# Patient Record
Sex: FEMALE | Race: WHITE | NOT HISPANIC OR LATINO | Employment: OTHER | URBAN - METROPOLITAN AREA
[De-identification: names, ages, dates, MRNs, and addresses within clinical notes are randomized per-mention and may not be internally consistent; named-entity substitution may affect disease eponyms.]

---

## 2018-10-01 PROBLEM — M17.11 PRIMARY OSTEOARTHRITIS OF RIGHT KNEE: Status: ACTIVE | Noted: 2018-10-01

## 2019-08-01 PROBLEM — D64.9 CHRONIC ANEMIA: Status: ACTIVE | Noted: 2019-08-01

## 2021-01-05 PROBLEM — I10 ESSENTIAL HYPERTENSION: Status: ACTIVE | Noted: 2021-01-05

## 2021-01-05 PROBLEM — R00.2 PALPITATIONS: Status: ACTIVE | Noted: 2021-01-05

## 2021-01-05 PROBLEM — I34.0 MITRAL VALVE INSUFFICIENCY: Status: ACTIVE | Noted: 2021-01-05

## 2021-01-05 PROBLEM — Z98.890 STATUS POST MITRAL VALVE ANNULOPLASTY: Status: ACTIVE | Noted: 2021-01-05

## 2021-01-05 PROBLEM — I47.10 PAROXYSMAL SVT (SUPRAVENTRICULAR TACHYCARDIA): Status: ACTIVE | Noted: 2021-01-05

## 2021-01-05 PROBLEM — Z03.89 CORONARY ARTERY DISEASE EXCLUDED: Status: ACTIVE | Noted: 2021-01-05

## 2021-01-13 DIAGNOSIS — M19.072 ARTHRITIS OF LEFT ANKLE: ICD-10-CM

## 2021-01-13 DIAGNOSIS — M19.071 ARTHRITIS OF RIGHT ANKLE: Primary | ICD-10-CM

## 2021-01-15 ENCOUNTER — IMMUNIZATION (OUTPATIENT)
Dept: PRIMARY CARE CLINIC | Facility: CLINIC | Age: 71
End: 2021-01-15
Payer: MEDICARE

## 2021-01-15 DIAGNOSIS — Z23 NEED FOR VACCINATION: Primary | ICD-10-CM

## 2021-01-15 PROCEDURE — 0001A COVID-19, MRNA, LNP-S, PF, 30 MCG/0.3 ML DOSE VACCINE: CPT | Mod: S$GLB,,, | Performed by: FAMILY MEDICINE

## 2021-01-15 PROCEDURE — 0001A COVID-19, MRNA, LNP-S, PF, 30 MCG/0.3 ML DOSE VACCINE: ICD-10-PCS | Mod: S$GLB,,, | Performed by: FAMILY MEDICINE

## 2021-01-15 PROCEDURE — 91300 COVID-19, MRNA, LNP-S, PF, 30 MCG/0.3 ML DOSE VACCINE: ICD-10-PCS | Mod: S$GLB,,, | Performed by: FAMILY MEDICINE

## 2021-01-15 PROCEDURE — 91300 COVID-19, MRNA, LNP-S, PF, 30 MCG/0.3 ML DOSE VACCINE: CPT | Mod: S$GLB,,, | Performed by: FAMILY MEDICINE

## 2021-01-21 ENCOUNTER — CLINICAL SUPPORT (OUTPATIENT)
Dept: REHABILITATION | Facility: HOSPITAL | Age: 71
End: 2021-01-21
Payer: MEDICARE

## 2021-01-21 DIAGNOSIS — M19.071 ARTHRITIS OF RIGHT ANKLE: ICD-10-CM

## 2021-01-21 DIAGNOSIS — R26.2 DIFFICULTY WALKING: ICD-10-CM

## 2021-01-21 DIAGNOSIS — M19.072 ARTHRITIS OF LEFT ANKLE: ICD-10-CM

## 2021-01-21 DIAGNOSIS — M62.81 MUSCLE WEAKNESS: ICD-10-CM

## 2021-01-21 PROCEDURE — 97161 PT EVAL LOW COMPLEX 20 MIN: CPT | Mod: PN

## 2021-01-27 ENCOUNTER — CLINICAL SUPPORT (OUTPATIENT)
Dept: REHABILITATION | Facility: HOSPITAL | Age: 71
End: 2021-01-27
Payer: MEDICARE

## 2021-01-27 DIAGNOSIS — M62.81 MUSCLE WEAKNESS: ICD-10-CM

## 2021-01-27 DIAGNOSIS — R26.2 DIFFICULTY WALKING: ICD-10-CM

## 2021-01-27 PROCEDURE — 97110 THERAPEUTIC EXERCISES: CPT | Mod: PN,CQ

## 2021-01-29 ENCOUNTER — CLINICAL SUPPORT (OUTPATIENT)
Dept: REHABILITATION | Facility: HOSPITAL | Age: 71
End: 2021-01-29
Payer: MEDICARE

## 2021-01-29 DIAGNOSIS — M62.81 MUSCLE WEAKNESS: ICD-10-CM

## 2021-01-29 DIAGNOSIS — R26.2 DIFFICULTY WALKING: ICD-10-CM

## 2021-01-29 PROCEDURE — 97110 THERAPEUTIC EXERCISES: CPT | Mod: PN

## 2021-02-01 ENCOUNTER — CLINICAL SUPPORT (OUTPATIENT)
Dept: REHABILITATION | Facility: HOSPITAL | Age: 71
End: 2021-02-01
Payer: MEDICARE

## 2021-02-01 DIAGNOSIS — M62.81 MUSCLE WEAKNESS: ICD-10-CM

## 2021-02-01 DIAGNOSIS — R26.2 DIFFICULTY WALKING: ICD-10-CM

## 2021-02-01 PROCEDURE — 97110 THERAPEUTIC EXERCISES: CPT | Mod: PN

## 2021-02-04 ENCOUNTER — CLINICAL SUPPORT (OUTPATIENT)
Dept: REHABILITATION | Facility: HOSPITAL | Age: 71
End: 2021-02-04
Payer: MEDICARE

## 2021-02-04 DIAGNOSIS — M62.81 MUSCLE WEAKNESS: ICD-10-CM

## 2021-02-04 DIAGNOSIS — R26.2 DIFFICULTY WALKING: ICD-10-CM

## 2021-02-04 PROCEDURE — 97110 THERAPEUTIC EXERCISES: CPT | Mod: PN,CQ

## 2021-02-05 ENCOUNTER — IMMUNIZATION (OUTPATIENT)
Dept: PRIMARY CARE CLINIC | Facility: CLINIC | Age: 71
End: 2021-02-05
Payer: MEDICARE

## 2021-02-05 DIAGNOSIS — Z23 NEED FOR VACCINATION: Primary | ICD-10-CM

## 2021-02-05 PROCEDURE — 91300 COVID-19, MRNA, LNP-S, PF, 30 MCG/0.3 ML DOSE VACCINE: ICD-10-PCS | Mod: S$GLB,,, | Performed by: FAMILY MEDICINE

## 2021-02-05 PROCEDURE — 91300 COVID-19, MRNA, LNP-S, PF, 30 MCG/0.3 ML DOSE VACCINE: CPT | Mod: S$GLB,,, | Performed by: FAMILY MEDICINE

## 2021-02-05 PROCEDURE — 0002A COVID-19, MRNA, LNP-S, PF, 30 MCG/0.3 ML DOSE VACCINE: CPT | Mod: S$GLB,,, | Performed by: FAMILY MEDICINE

## 2021-02-05 PROCEDURE — 0002A COVID-19, MRNA, LNP-S, PF, 30 MCG/0.3 ML DOSE VACCINE: ICD-10-PCS | Mod: S$GLB,,, | Performed by: FAMILY MEDICINE

## 2021-02-08 ENCOUNTER — CLINICAL SUPPORT (OUTPATIENT)
Dept: REHABILITATION | Facility: HOSPITAL | Age: 71
End: 2021-02-08
Payer: MEDICARE

## 2021-02-08 DIAGNOSIS — R26.2 DIFFICULTY WALKING: ICD-10-CM

## 2021-02-08 DIAGNOSIS — M62.81 MUSCLE WEAKNESS: ICD-10-CM

## 2021-02-08 PROCEDURE — 97110 THERAPEUTIC EXERCISES: CPT | Mod: PN

## 2021-02-17 ENCOUNTER — CLINICAL SUPPORT (OUTPATIENT)
Dept: REHABILITATION | Facility: HOSPITAL | Age: 71
End: 2021-02-17
Payer: MEDICARE

## 2021-02-17 DIAGNOSIS — M62.81 MUSCLE WEAKNESS: ICD-10-CM

## 2021-02-17 DIAGNOSIS — R26.2 DIFFICULTY WALKING: ICD-10-CM

## 2021-02-17 PROCEDURE — 97110 THERAPEUTIC EXERCISES: CPT | Mod: PN,CQ

## 2021-02-19 ENCOUNTER — CLINICAL SUPPORT (OUTPATIENT)
Dept: REHABILITATION | Facility: HOSPITAL | Age: 71
End: 2021-02-19
Payer: MEDICARE

## 2021-02-19 DIAGNOSIS — R26.2 DIFFICULTY WALKING: ICD-10-CM

## 2021-02-19 DIAGNOSIS — M62.81 MUSCLE WEAKNESS: ICD-10-CM

## 2021-02-19 PROCEDURE — 97110 THERAPEUTIC EXERCISES: CPT | Mod: PN,CQ

## 2021-02-19 PROCEDURE — 97116 GAIT TRAINING THERAPY: CPT | Mod: PN,CQ

## 2021-02-22 ENCOUNTER — CLINICAL SUPPORT (OUTPATIENT)
Dept: REHABILITATION | Facility: HOSPITAL | Age: 71
End: 2021-02-22
Payer: MEDICARE

## 2021-02-22 DIAGNOSIS — R26.2 DIFFICULTY WALKING: ICD-10-CM

## 2021-02-22 DIAGNOSIS — M62.81 MUSCLE WEAKNESS: ICD-10-CM

## 2021-02-22 PROCEDURE — 97116 GAIT TRAINING THERAPY: CPT | Mod: PN,CQ

## 2021-02-22 PROCEDURE — 97110 THERAPEUTIC EXERCISES: CPT | Mod: PN,CQ

## 2021-02-26 ENCOUNTER — CLINICAL SUPPORT (OUTPATIENT)
Dept: REHABILITATION | Facility: HOSPITAL | Age: 71
End: 2021-02-26
Payer: MEDICARE

## 2021-02-26 DIAGNOSIS — M62.81 MUSCLE WEAKNESS: ICD-10-CM

## 2021-02-26 DIAGNOSIS — R26.2 DIFFICULTY WALKING: ICD-10-CM

## 2021-02-26 PROCEDURE — 97110 THERAPEUTIC EXERCISES: CPT | Mod: PN

## 2021-02-26 PROCEDURE — 97116 GAIT TRAINING THERAPY: CPT | Mod: PN

## 2021-03-01 ENCOUNTER — CLINICAL SUPPORT (OUTPATIENT)
Dept: REHABILITATION | Facility: HOSPITAL | Age: 71
End: 2021-03-01
Payer: MEDICARE

## 2021-03-01 DIAGNOSIS — M62.81 MUSCLE WEAKNESS: ICD-10-CM

## 2021-03-01 DIAGNOSIS — R26.2 DIFFICULTY WALKING: ICD-10-CM

## 2021-03-01 PROCEDURE — 97112 NEUROMUSCULAR REEDUCATION: CPT | Mod: PN

## 2021-03-01 PROCEDURE — 97110 THERAPEUTIC EXERCISES: CPT | Mod: PN

## 2021-03-05 ENCOUNTER — CLINICAL SUPPORT (OUTPATIENT)
Dept: REHABILITATION | Facility: HOSPITAL | Age: 71
End: 2021-03-05
Payer: MEDICARE

## 2021-03-05 DIAGNOSIS — R26.2 DIFFICULTY WALKING: ICD-10-CM

## 2021-03-05 DIAGNOSIS — M62.81 MUSCLE WEAKNESS: ICD-10-CM

## 2021-03-05 PROCEDURE — 97112 NEUROMUSCULAR REEDUCATION: CPT | Mod: PN

## 2021-03-05 PROCEDURE — 97110 THERAPEUTIC EXERCISES: CPT | Mod: PN

## 2021-03-09 ENCOUNTER — CLINICAL SUPPORT (OUTPATIENT)
Dept: REHABILITATION | Facility: HOSPITAL | Age: 71
End: 2021-03-09
Payer: MEDICARE

## 2021-03-09 DIAGNOSIS — M62.81 MUSCLE WEAKNESS: ICD-10-CM

## 2021-03-09 DIAGNOSIS — R26.2 DIFFICULTY WALKING: ICD-10-CM

## 2021-03-09 PROCEDURE — 97112 NEUROMUSCULAR REEDUCATION: CPT | Mod: PN

## 2021-03-09 PROCEDURE — 97110 THERAPEUTIC EXERCISES: CPT | Mod: PN

## 2021-03-12 ENCOUNTER — CLINICAL SUPPORT (OUTPATIENT)
Dept: REHABILITATION | Facility: HOSPITAL | Age: 71
End: 2021-03-12
Payer: MEDICARE

## 2021-03-12 DIAGNOSIS — M62.81 MUSCLE WEAKNESS: ICD-10-CM

## 2021-03-12 DIAGNOSIS — R26.2 DIFFICULTY WALKING: ICD-10-CM

## 2021-03-12 PROCEDURE — 97110 THERAPEUTIC EXERCISES: CPT | Mod: PN,CQ

## 2021-03-12 PROCEDURE — 97112 NEUROMUSCULAR REEDUCATION: CPT | Mod: PN,CQ

## 2021-03-15 ENCOUNTER — CLINICAL SUPPORT (OUTPATIENT)
Dept: REHABILITATION | Facility: HOSPITAL | Age: 71
End: 2021-03-15
Payer: MEDICARE

## 2021-03-15 DIAGNOSIS — M62.81 MUSCLE WEAKNESS: ICD-10-CM

## 2021-03-15 DIAGNOSIS — R26.2 DIFFICULTY WALKING: ICD-10-CM

## 2021-03-15 PROCEDURE — 97110 THERAPEUTIC EXERCISES: CPT | Mod: PN

## 2021-03-15 PROCEDURE — 97112 NEUROMUSCULAR REEDUCATION: CPT | Mod: PN

## 2021-03-19 ENCOUNTER — CLINICAL SUPPORT (OUTPATIENT)
Dept: REHABILITATION | Facility: HOSPITAL | Age: 71
End: 2021-03-19
Payer: MEDICARE

## 2021-03-19 DIAGNOSIS — M62.81 MUSCLE WEAKNESS: ICD-10-CM

## 2021-03-19 DIAGNOSIS — R26.2 DIFFICULTY WALKING: ICD-10-CM

## 2021-03-19 PROCEDURE — 97112 NEUROMUSCULAR REEDUCATION: CPT | Mod: PN

## 2021-03-19 PROCEDURE — 97530 THERAPEUTIC ACTIVITIES: CPT | Mod: PN

## 2021-03-22 ENCOUNTER — CLINICAL SUPPORT (OUTPATIENT)
Dept: REHABILITATION | Facility: HOSPITAL | Age: 71
End: 2021-03-22
Payer: MEDICARE

## 2021-03-22 DIAGNOSIS — R26.2 DIFFICULTY WALKING: ICD-10-CM

## 2021-03-22 DIAGNOSIS — M62.81 MUSCLE WEAKNESS: ICD-10-CM

## 2021-03-22 PROCEDURE — 97110 THERAPEUTIC EXERCISES: CPT | Mod: PN,CQ

## 2021-03-22 PROCEDURE — 97112 NEUROMUSCULAR REEDUCATION: CPT | Mod: PN,CQ

## 2021-03-22 PROCEDURE — 97530 THERAPEUTIC ACTIVITIES: CPT | Mod: PN,CQ

## 2021-03-26 ENCOUNTER — CLINICAL SUPPORT (OUTPATIENT)
Dept: REHABILITATION | Facility: HOSPITAL | Age: 71
End: 2021-03-26
Payer: MEDICARE

## 2021-03-26 DIAGNOSIS — R26.2 DIFFICULTY WALKING: ICD-10-CM

## 2021-03-26 DIAGNOSIS — M62.81 MUSCLE WEAKNESS: ICD-10-CM

## 2021-03-26 PROCEDURE — 97110 THERAPEUTIC EXERCISES: CPT | Mod: PN

## 2021-03-26 PROCEDURE — 97112 NEUROMUSCULAR REEDUCATION: CPT | Mod: PN

## 2021-03-29 ENCOUNTER — CLINICAL SUPPORT (OUTPATIENT)
Dept: REHABILITATION | Facility: HOSPITAL | Age: 71
End: 2021-03-29
Payer: MEDICARE

## 2021-03-29 DIAGNOSIS — R26.2 DIFFICULTY WALKING: ICD-10-CM

## 2021-03-29 DIAGNOSIS — M62.81 MUSCLE WEAKNESS: ICD-10-CM

## 2021-03-29 PROCEDURE — 97110 THERAPEUTIC EXERCISES: CPT | Mod: PN

## 2021-04-01 ENCOUNTER — CLINICAL SUPPORT (OUTPATIENT)
Dept: REHABILITATION | Facility: HOSPITAL | Age: 71
End: 2021-04-01
Payer: MEDICARE

## 2021-04-01 DIAGNOSIS — R26.2 DIFFICULTY WALKING: ICD-10-CM

## 2021-04-01 DIAGNOSIS — M62.81 MUSCLE WEAKNESS: ICD-10-CM

## 2021-04-01 PROCEDURE — 97110 THERAPEUTIC EXERCISES: CPT | Mod: PN

## 2021-04-05 ENCOUNTER — CLINICAL SUPPORT (OUTPATIENT)
Dept: REHABILITATION | Facility: HOSPITAL | Age: 71
End: 2021-04-05
Payer: MEDICARE

## 2021-04-05 DIAGNOSIS — M62.81 MUSCLE WEAKNESS: ICD-10-CM

## 2021-04-05 DIAGNOSIS — R26.2 DIFFICULTY WALKING: ICD-10-CM

## 2021-04-05 PROCEDURE — 97110 THERAPEUTIC EXERCISES: CPT | Mod: PN

## 2021-04-09 ENCOUNTER — CLINICAL SUPPORT (OUTPATIENT)
Dept: REHABILITATION | Facility: HOSPITAL | Age: 71
End: 2021-04-09
Payer: MEDICARE

## 2021-04-09 DIAGNOSIS — M62.81 MUSCLE WEAKNESS: ICD-10-CM

## 2021-04-09 DIAGNOSIS — R26.2 DIFFICULTY WALKING: ICD-10-CM

## 2021-04-09 PROCEDURE — 97110 THERAPEUTIC EXERCISES: CPT | Mod: PN,CQ

## 2021-04-19 ENCOUNTER — CLINICAL SUPPORT (OUTPATIENT)
Dept: REHABILITATION | Facility: HOSPITAL | Age: 71
End: 2021-04-19
Payer: MEDICARE

## 2021-04-19 DIAGNOSIS — R26.2 DIFFICULTY WALKING: ICD-10-CM

## 2021-04-19 DIAGNOSIS — M62.81 MUSCLE WEAKNESS: ICD-10-CM

## 2021-04-19 PROCEDURE — 97112 NEUROMUSCULAR REEDUCATION: CPT | Mod: KX,PN

## 2021-04-19 PROCEDURE — 97110 THERAPEUTIC EXERCISES: CPT | Mod: KX,PN

## 2021-04-23 ENCOUNTER — CLINICAL SUPPORT (OUTPATIENT)
Dept: REHABILITATION | Facility: HOSPITAL | Age: 71
End: 2021-04-23
Payer: MEDICARE

## 2021-04-23 DIAGNOSIS — R26.2 DIFFICULTY WALKING: ICD-10-CM

## 2021-04-23 DIAGNOSIS — M62.81 MUSCLE WEAKNESS: ICD-10-CM

## 2021-04-23 PROCEDURE — 97110 THERAPEUTIC EXERCISES: CPT | Mod: PN

## 2021-04-23 PROCEDURE — 97112 NEUROMUSCULAR REEDUCATION: CPT | Mod: PN

## 2021-04-26 ENCOUNTER — CLINICAL SUPPORT (OUTPATIENT)
Dept: REHABILITATION | Facility: HOSPITAL | Age: 71
End: 2021-04-26
Payer: MEDICARE

## 2021-04-26 DIAGNOSIS — M62.81 MUSCLE WEAKNESS: ICD-10-CM

## 2021-04-26 DIAGNOSIS — R26.2 DIFFICULTY WALKING: ICD-10-CM

## 2021-04-26 PROCEDURE — 97110 THERAPEUTIC EXERCISES: CPT | Mod: KX,PN

## 2021-04-26 PROCEDURE — 97112 NEUROMUSCULAR REEDUCATION: CPT | Mod: KX,PN

## 2021-05-07 ENCOUNTER — CLINICAL SUPPORT (OUTPATIENT)
Dept: REHABILITATION | Facility: HOSPITAL | Age: 71
End: 2021-05-07
Payer: MEDICARE

## 2021-05-07 DIAGNOSIS — R26.2 DIFFICULTY WALKING: ICD-10-CM

## 2021-05-07 DIAGNOSIS — M62.81 MUSCLE WEAKNESS: ICD-10-CM

## 2021-05-07 PROCEDURE — 97110 THERAPEUTIC EXERCISES: CPT | Mod: PN,CQ

## 2021-05-07 PROCEDURE — 97116 GAIT TRAINING THERAPY: CPT | Mod: PN,CQ

## 2021-05-14 ENCOUNTER — CLINICAL SUPPORT (OUTPATIENT)
Dept: REHABILITATION | Facility: HOSPITAL | Age: 71
End: 2021-05-14
Payer: MEDICARE

## 2021-05-14 DIAGNOSIS — R26.2 DIFFICULTY WALKING: ICD-10-CM

## 2021-05-14 DIAGNOSIS — M62.81 MUSCLE WEAKNESS: Primary | ICD-10-CM

## 2021-05-14 PROCEDURE — 97112 NEUROMUSCULAR REEDUCATION: CPT | Mod: PN

## 2021-05-14 PROCEDURE — 97110 THERAPEUTIC EXERCISES: CPT | Mod: PN

## 2021-05-24 PROBLEM — R73.9 BLOOD GLUCOSE ELEVATED: Status: ACTIVE | Noted: 2021-05-24

## 2021-05-25 ENCOUNTER — CLINICAL SUPPORT (OUTPATIENT)
Dept: REHABILITATION | Facility: HOSPITAL | Age: 71
End: 2021-05-25
Payer: MEDICARE

## 2021-05-25 ENCOUNTER — DOCUMENTATION ONLY (OUTPATIENT)
Dept: REHABILITATION | Facility: HOSPITAL | Age: 71
End: 2021-05-25

## 2021-05-25 DIAGNOSIS — R26.2 DIFFICULTY WALKING: ICD-10-CM

## 2021-05-25 DIAGNOSIS — M62.81 MUSCLE WEAKNESS: Primary | ICD-10-CM

## 2021-05-25 PROCEDURE — 97112 NEUROMUSCULAR REEDUCATION: CPT | Mod: PN

## 2021-05-25 PROCEDURE — 97110 THERAPEUTIC EXERCISES: CPT | Mod: PN

## 2021-05-25 PROCEDURE — 97750 PHYSICAL PERFORMANCE TEST: CPT | Mod: PN

## 2021-05-28 ENCOUNTER — CLINICAL SUPPORT (OUTPATIENT)
Dept: REHABILITATION | Facility: HOSPITAL | Age: 71
End: 2021-05-28
Payer: MEDICARE

## 2021-05-28 DIAGNOSIS — R26.2 DIFFICULTY WALKING: ICD-10-CM

## 2021-05-28 DIAGNOSIS — M62.81 MUSCLE WEAKNESS: Primary | ICD-10-CM

## 2021-05-28 PROCEDURE — 97750 PHYSICAL PERFORMANCE TEST: CPT | Mod: PN

## 2021-05-28 PROCEDURE — 97112 NEUROMUSCULAR REEDUCATION: CPT | Mod: PN

## 2021-05-28 PROCEDURE — 97110 THERAPEUTIC EXERCISES: CPT | Mod: PN

## 2021-06-02 ENCOUNTER — CLINICAL SUPPORT (OUTPATIENT)
Dept: REHABILITATION | Facility: HOSPITAL | Age: 71
End: 2021-06-02
Payer: MEDICARE

## 2021-06-02 DIAGNOSIS — R26.2 DIFFICULTY WALKING: ICD-10-CM

## 2021-06-02 DIAGNOSIS — M62.81 MUSCLE WEAKNESS: Primary | ICD-10-CM

## 2021-06-02 PROCEDURE — 97112 NEUROMUSCULAR REEDUCATION: CPT | Mod: PN

## 2021-06-02 PROCEDURE — 97110 THERAPEUTIC EXERCISES: CPT | Mod: PN

## 2021-06-04 ENCOUNTER — CLINICAL SUPPORT (OUTPATIENT)
Dept: REHABILITATION | Facility: HOSPITAL | Age: 71
End: 2021-06-04
Payer: MEDICARE

## 2021-06-04 DIAGNOSIS — R26.2 DIFFICULTY WALKING: ICD-10-CM

## 2021-06-04 DIAGNOSIS — M62.81 MUSCLE WEAKNESS: ICD-10-CM

## 2021-06-04 PROCEDURE — 97110 THERAPEUTIC EXERCISES: CPT | Mod: PN,CQ

## 2021-06-04 PROCEDURE — 97112 NEUROMUSCULAR REEDUCATION: CPT | Mod: PN,CQ

## 2021-06-07 ENCOUNTER — CLINICAL SUPPORT (OUTPATIENT)
Dept: REHABILITATION | Facility: HOSPITAL | Age: 71
End: 2021-06-07
Payer: MEDICARE

## 2021-06-07 DIAGNOSIS — R26.2 DIFFICULTY WALKING: ICD-10-CM

## 2021-06-07 DIAGNOSIS — M62.81 MUSCLE WEAKNESS: Primary | ICD-10-CM

## 2021-06-07 PROCEDURE — 97112 NEUROMUSCULAR REEDUCATION: CPT | Mod: PN

## 2021-06-07 PROCEDURE — 97110 THERAPEUTIC EXERCISES: CPT | Mod: PN

## 2021-06-11 ENCOUNTER — CLINICAL SUPPORT (OUTPATIENT)
Dept: REHABILITATION | Facility: HOSPITAL | Age: 71
End: 2021-06-11
Payer: MEDICARE

## 2021-06-11 DIAGNOSIS — R26.2 DIFFICULTY WALKING: ICD-10-CM

## 2021-06-11 DIAGNOSIS — M62.81 MUSCLE WEAKNESS: ICD-10-CM

## 2021-06-11 PROCEDURE — 97112 NEUROMUSCULAR REEDUCATION: CPT | Mod: PN,CQ

## 2021-06-11 PROCEDURE — 97110 THERAPEUTIC EXERCISES: CPT | Mod: PN,CQ

## 2021-06-14 ENCOUNTER — CLINICAL SUPPORT (OUTPATIENT)
Dept: REHABILITATION | Facility: HOSPITAL | Age: 71
End: 2021-06-14
Payer: MEDICARE

## 2021-06-14 DIAGNOSIS — R26.2 DIFFICULTY WALKING: ICD-10-CM

## 2021-06-14 DIAGNOSIS — M62.81 MUSCLE WEAKNESS: ICD-10-CM

## 2021-06-14 PROCEDURE — 97112 NEUROMUSCULAR REEDUCATION: CPT | Mod: PN,CQ

## 2021-06-14 PROCEDURE — 97110 THERAPEUTIC EXERCISES: CPT | Mod: PN,CQ

## 2021-06-22 ENCOUNTER — CLINICAL SUPPORT (OUTPATIENT)
Dept: REHABILITATION | Facility: HOSPITAL | Age: 71
End: 2021-06-22
Payer: MEDICARE

## 2021-06-22 DIAGNOSIS — R26.2 DIFFICULTY WALKING: ICD-10-CM

## 2021-06-22 DIAGNOSIS — M62.81 MUSCLE WEAKNESS: Primary | ICD-10-CM

## 2021-06-22 PROCEDURE — 97110 THERAPEUTIC EXERCISES: CPT | Mod: PN

## 2021-06-22 PROCEDURE — 97750 PHYSICAL PERFORMANCE TEST: CPT | Mod: PN

## 2021-06-22 PROCEDURE — 97112 NEUROMUSCULAR REEDUCATION: CPT | Mod: PN

## 2021-06-24 PROBLEM — R26.2 DIFFICULTY WALKING: Status: RESOLVED | Noted: 2021-01-21 | Resolved: 2021-06-24

## 2021-06-24 PROBLEM — M62.81 MUSCLE WEAKNESS: Status: RESOLVED | Noted: 2021-01-21 | Resolved: 2021-06-24

## 2021-10-08 DIAGNOSIS — R29.6 FALLS FREQUENTLY: ICD-10-CM

## 2021-10-08 DIAGNOSIS — R26.89 BALANCE PROBLEM: Primary | ICD-10-CM

## 2021-10-19 ENCOUNTER — CLINICAL SUPPORT (OUTPATIENT)
Dept: REHABILITATION | Facility: HOSPITAL | Age: 71
End: 2021-10-19
Payer: MEDICARE

## 2021-10-19 DIAGNOSIS — R26.9 GAIT ABNORMALITY: ICD-10-CM

## 2021-10-19 DIAGNOSIS — R26.89 DECREASED FUNCTIONAL MOBILITY: ICD-10-CM

## 2021-10-19 DIAGNOSIS — R26.89 BALANCE PROBLEM: ICD-10-CM

## 2021-10-19 PROCEDURE — 97116 GAIT TRAINING THERAPY: CPT | Mod: PN

## 2021-10-19 PROCEDURE — 97161 PT EVAL LOW COMPLEX 20 MIN: CPT | Mod: PN

## 2021-10-27 ENCOUNTER — CLINICAL SUPPORT (OUTPATIENT)
Dept: REHABILITATION | Facility: HOSPITAL | Age: 71
End: 2021-10-27
Payer: MEDICARE

## 2021-10-27 DIAGNOSIS — R26.9 GAIT ABNORMALITY: ICD-10-CM

## 2021-10-27 DIAGNOSIS — R26.89 BALANCE PROBLEM: ICD-10-CM

## 2021-10-27 DIAGNOSIS — R26.89 DECREASED FUNCTIONAL MOBILITY: ICD-10-CM

## 2021-10-27 PROCEDURE — 97112 NEUROMUSCULAR REEDUCATION: CPT | Mod: KX,PN,CQ

## 2021-10-27 PROCEDURE — 97530 THERAPEUTIC ACTIVITIES: CPT | Mod: KX,PN,CQ

## 2021-10-27 PROCEDURE — 97116 GAIT TRAINING THERAPY: CPT | Mod: KX,PN,CQ

## 2021-11-01 ENCOUNTER — CLINICAL SUPPORT (OUTPATIENT)
Dept: REHABILITATION | Facility: HOSPITAL | Age: 71
End: 2021-11-01
Payer: MEDICARE

## 2021-11-01 DIAGNOSIS — R26.9 GAIT ABNORMALITY: ICD-10-CM

## 2021-11-01 DIAGNOSIS — R26.89 BALANCE PROBLEM: ICD-10-CM

## 2021-11-01 DIAGNOSIS — R26.89 DECREASED FUNCTIONAL MOBILITY: ICD-10-CM

## 2021-11-01 PROCEDURE — 97530 THERAPEUTIC ACTIVITIES: CPT | Mod: PN

## 2021-11-01 PROCEDURE — 97112 NEUROMUSCULAR REEDUCATION: CPT | Mod: PN

## 2021-11-01 PROCEDURE — 97116 GAIT TRAINING THERAPY: CPT | Mod: PN

## 2021-11-04 ENCOUNTER — CLINICAL SUPPORT (OUTPATIENT)
Dept: REHABILITATION | Facility: HOSPITAL | Age: 71
End: 2021-11-04
Payer: MEDICARE

## 2021-11-04 DIAGNOSIS — R26.89 DECREASED FUNCTIONAL MOBILITY: ICD-10-CM

## 2021-11-04 DIAGNOSIS — R26.89 BALANCE PROBLEM: ICD-10-CM

## 2021-11-04 DIAGNOSIS — R26.9 GAIT ABNORMALITY: ICD-10-CM

## 2021-11-04 PROCEDURE — 97116 GAIT TRAINING THERAPY: CPT | Mod: KX,PN,CQ

## 2021-11-04 PROCEDURE — 97112 NEUROMUSCULAR REEDUCATION: CPT | Mod: KX,PN,CQ

## 2021-11-04 PROCEDURE — 97530 THERAPEUTIC ACTIVITIES: CPT | Mod: KX,PN,CQ

## 2021-11-08 ENCOUNTER — CLINICAL SUPPORT (OUTPATIENT)
Dept: REHABILITATION | Facility: HOSPITAL | Age: 71
End: 2021-11-08
Payer: MEDICARE

## 2021-11-08 DIAGNOSIS — R26.89 BALANCE PROBLEM: ICD-10-CM

## 2021-11-08 DIAGNOSIS — R26.89 DECREASED FUNCTIONAL MOBILITY: ICD-10-CM

## 2021-11-08 DIAGNOSIS — R26.9 GAIT ABNORMALITY: ICD-10-CM

## 2021-11-08 PROCEDURE — 97110 THERAPEUTIC EXERCISES: CPT | Mod: PN

## 2021-11-08 PROCEDURE — 97112 NEUROMUSCULAR REEDUCATION: CPT | Mod: PN

## 2021-11-08 PROCEDURE — 97116 GAIT TRAINING THERAPY: CPT | Mod: PN

## 2021-11-11 ENCOUNTER — CLINICAL SUPPORT (OUTPATIENT)
Dept: REHABILITATION | Facility: HOSPITAL | Age: 71
End: 2021-11-11
Payer: MEDICARE

## 2021-11-11 DIAGNOSIS — R26.9 GAIT ABNORMALITY: ICD-10-CM

## 2021-11-11 DIAGNOSIS — R26.89 BALANCE PROBLEM: ICD-10-CM

## 2021-11-11 DIAGNOSIS — R26.89 DECREASED FUNCTIONAL MOBILITY: ICD-10-CM

## 2021-11-11 PROCEDURE — 97116 GAIT TRAINING THERAPY: CPT | Mod: PN

## 2021-11-11 PROCEDURE — 97112 NEUROMUSCULAR REEDUCATION: CPT | Mod: PN

## 2021-11-16 ENCOUNTER — CLINICAL SUPPORT (OUTPATIENT)
Dept: REHABILITATION | Facility: HOSPITAL | Age: 71
End: 2021-11-16
Payer: MEDICARE

## 2021-11-16 ENCOUNTER — DOCUMENTATION ONLY (OUTPATIENT)
Dept: REHABILITATION | Facility: HOSPITAL | Age: 71
End: 2021-11-16
Payer: MEDICARE

## 2021-11-16 DIAGNOSIS — R26.89 BALANCE PROBLEM: ICD-10-CM

## 2021-11-16 DIAGNOSIS — R26.9 GAIT ABNORMALITY: ICD-10-CM

## 2021-11-16 DIAGNOSIS — R26.89 DECREASED FUNCTIONAL MOBILITY: ICD-10-CM

## 2021-11-16 PROCEDURE — 97116 GAIT TRAINING THERAPY: CPT | Mod: KX,PN

## 2021-11-16 PROCEDURE — 97112 NEUROMUSCULAR REEDUCATION: CPT | Mod: KX,PN

## 2021-11-23 ENCOUNTER — CLINICAL SUPPORT (OUTPATIENT)
Dept: REHABILITATION | Facility: HOSPITAL | Age: 71
End: 2021-11-23
Payer: MEDICARE

## 2021-11-23 DIAGNOSIS — R26.89 BALANCE PROBLEM: ICD-10-CM

## 2021-11-23 DIAGNOSIS — R26.9 GAIT ABNORMALITY: ICD-10-CM

## 2021-11-23 DIAGNOSIS — R26.89 DECREASED FUNCTIONAL MOBILITY: ICD-10-CM

## 2021-11-23 PROCEDURE — 97112 NEUROMUSCULAR REEDUCATION: CPT | Mod: KX,PN,CQ

## 2021-11-23 PROCEDURE — 97116 GAIT TRAINING THERAPY: CPT | Mod: KX,PN,CQ

## 2021-11-30 ENCOUNTER — CLINICAL SUPPORT (OUTPATIENT)
Dept: REHABILITATION | Facility: HOSPITAL | Age: 71
End: 2021-11-30
Payer: MEDICARE

## 2021-11-30 DIAGNOSIS — R26.89 DECREASED FUNCTIONAL MOBILITY: ICD-10-CM

## 2021-11-30 DIAGNOSIS — R26.89 BALANCE PROBLEM: ICD-10-CM

## 2021-11-30 DIAGNOSIS — R26.9 GAIT ABNORMALITY: ICD-10-CM

## 2021-11-30 PROCEDURE — 97116 GAIT TRAINING THERAPY: CPT | Mod: PN

## 2021-11-30 PROCEDURE — 97112 NEUROMUSCULAR REEDUCATION: CPT | Mod: PN

## 2021-12-07 ENCOUNTER — CLINICAL SUPPORT (OUTPATIENT)
Dept: REHABILITATION | Facility: HOSPITAL | Age: 71
End: 2021-12-07
Payer: MEDICARE

## 2021-12-07 DIAGNOSIS — R26.9 GAIT ABNORMALITY: ICD-10-CM

## 2021-12-07 DIAGNOSIS — R26.89 BALANCE PROBLEM: ICD-10-CM

## 2021-12-07 DIAGNOSIS — R26.89 DECREASED FUNCTIONAL MOBILITY: ICD-10-CM

## 2021-12-07 PROCEDURE — 97530 THERAPEUTIC ACTIVITIES: CPT | Mod: PN,CQ

## 2021-12-07 PROCEDURE — 97110 THERAPEUTIC EXERCISES: CPT | Mod: PN,CQ

## 2021-12-07 PROCEDURE — 97116 GAIT TRAINING THERAPY: CPT | Mod: PN,CQ

## 2021-12-09 ENCOUNTER — OFFICE VISIT (OUTPATIENT)
Dept: NEUROLOGY | Facility: CLINIC | Age: 71
End: 2021-12-09
Payer: MEDICARE

## 2021-12-09 ENCOUNTER — LAB VISIT (OUTPATIENT)
Dept: LAB | Facility: HOSPITAL | Age: 71
End: 2021-12-09
Attending: NURSE PRACTITIONER
Payer: MEDICARE

## 2021-12-09 VITALS
DIASTOLIC BLOOD PRESSURE: 77 MMHG | BODY MASS INDEX: 26.66 KG/M2 | SYSTOLIC BLOOD PRESSURE: 129 MMHG | RESPIRATION RATE: 18 BRPM | TEMPERATURE: 97 F | WEIGHT: 170.19 LBS | HEART RATE: 70 BPM

## 2021-12-09 DIAGNOSIS — R29.6 FREQUENT FALLS: Primary | ICD-10-CM

## 2021-12-09 DIAGNOSIS — R26.89 BALANCE PROBLEM: ICD-10-CM

## 2021-12-09 DIAGNOSIS — R29.6 FREQUENT FALLS: ICD-10-CM

## 2021-12-09 DIAGNOSIS — F41.1 GENERALIZED ANXIETY DISORDER: ICD-10-CM

## 2021-12-09 DIAGNOSIS — R47.9 SPEECH DISTURBANCE, UNSPECIFIED TYPE: ICD-10-CM

## 2021-12-09 LAB
CREAT SERPL-MCNC: 1 MG/DL (ref 0.5–1.4)
EST. GFR  (AFRICAN AMERICAN): >60 ML/MIN/1.73 M^2
EST. GFR  (NON AFRICAN AMERICAN): 56 ML/MIN/1.73 M^2

## 2021-12-09 PROCEDURE — 99205 OFFICE O/P NEW HI 60 MIN: CPT | Mod: S$PBB,,, | Performed by: NURSE PRACTITIONER

## 2021-12-09 PROCEDURE — 82565 ASSAY OF CREATININE: CPT | Performed by: NURSE PRACTITIONER

## 2021-12-09 PROCEDURE — 99205 PR OFFICE/OUTPT VISIT, NEW, LEVL V, 60-74 MIN: ICD-10-PCS | Mod: S$PBB,,, | Performed by: NURSE PRACTITIONER

## 2021-12-09 PROCEDURE — 99215 OFFICE O/P EST HI 40 MIN: CPT | Mod: PBBFAC,PO | Performed by: NURSE PRACTITIONER

## 2021-12-09 PROCEDURE — 36415 COLL VENOUS BLD VENIPUNCTURE: CPT | Mod: PO | Performed by: NURSE PRACTITIONER

## 2021-12-09 PROCEDURE — 99999 PR PBB SHADOW E&M-EST. PATIENT-LVL V: ICD-10-PCS | Mod: PBBFAC,,, | Performed by: NURSE PRACTITIONER

## 2021-12-09 PROCEDURE — 99999 PR PBB SHADOW E&M-EST. PATIENT-LVL V: CPT | Mod: PBBFAC,,, | Performed by: NURSE PRACTITIONER

## 2021-12-14 ENCOUNTER — CLINICAL SUPPORT (OUTPATIENT)
Dept: REHABILITATION | Facility: HOSPITAL | Age: 71
End: 2021-12-14
Payer: MEDICARE

## 2021-12-14 DIAGNOSIS — R26.89 DECREASED FUNCTIONAL MOBILITY: ICD-10-CM

## 2021-12-14 DIAGNOSIS — R26.89 BALANCE PROBLEM: ICD-10-CM

## 2021-12-14 DIAGNOSIS — R26.9 GAIT ABNORMALITY: ICD-10-CM

## 2021-12-14 PROCEDURE — 97530 THERAPEUTIC ACTIVITIES: CPT | Mod: KX,PN,CQ

## 2021-12-14 PROCEDURE — 97112 NEUROMUSCULAR REEDUCATION: CPT | Mod: KX,PN,CQ

## 2021-12-16 ENCOUNTER — CLINICAL SUPPORT (OUTPATIENT)
Dept: REHABILITATION | Facility: HOSPITAL | Age: 71
End: 2021-12-16
Payer: MEDICARE

## 2021-12-16 ENCOUNTER — DOCUMENTATION ONLY (OUTPATIENT)
Dept: REHABILITATION | Facility: HOSPITAL | Age: 71
End: 2021-12-16
Payer: MEDICARE

## 2021-12-16 DIAGNOSIS — R26.89 DECREASED FUNCTIONAL MOBILITY: ICD-10-CM

## 2021-12-16 DIAGNOSIS — R26.89 BALANCE PROBLEM: ICD-10-CM

## 2021-12-16 DIAGNOSIS — R26.9 GAIT ABNORMALITY: ICD-10-CM

## 2021-12-16 PROCEDURE — 97116 GAIT TRAINING THERAPY: CPT | Mod: PN

## 2021-12-16 PROCEDURE — 97112 NEUROMUSCULAR REEDUCATION: CPT | Mod: PN

## 2021-12-16 PROCEDURE — 97110 THERAPEUTIC EXERCISES: CPT | Mod: PN

## 2021-12-17 ENCOUNTER — HOSPITAL ENCOUNTER (OUTPATIENT)
Dept: RADIOLOGY | Facility: HOSPITAL | Age: 71
Discharge: HOME | End: 2021-12-17
Attending: NURSE PRACTITIONER
Payer: MEDICARE

## 2021-12-17 DIAGNOSIS — R47.9 SPEECH DISTURBANCE, UNSPECIFIED TYPE: ICD-10-CM

## 2021-12-17 DIAGNOSIS — R29.6 FREQUENT FALLS: ICD-10-CM

## 2021-12-17 PROCEDURE — 70496 CTA HEAD AND NECK (XPD): ICD-10-PCS | Mod: 26,,, | Performed by: RADIOLOGY

## 2021-12-17 PROCEDURE — 70498 CTA HEAD AND NECK (XPD): ICD-10-PCS | Mod: 26,,, | Performed by: RADIOLOGY

## 2021-12-17 PROCEDURE — 70496 CT ANGIOGRAPHY HEAD: CPT | Mod: 26,,, | Performed by: RADIOLOGY

## 2021-12-17 PROCEDURE — 25500020 PHARM REV CODE 255: Mod: PO | Performed by: NURSE PRACTITIONER

## 2021-12-17 PROCEDURE — 70496 CT ANGIOGRAPHY HEAD: CPT | Mod: TC,PO

## 2021-12-17 PROCEDURE — 70498 CT ANGIOGRAPHY NECK: CPT | Mod: 26,,, | Performed by: RADIOLOGY

## 2021-12-17 RX ADMIN — IOHEXOL 100 ML: 350 INJECTION, SOLUTION INTRAVENOUS at 12:12

## 2021-12-20 ENCOUNTER — TELEPHONE (OUTPATIENT)
Dept: NEUROLOGY | Facility: CLINIC | Age: 71
End: 2021-12-20
Payer: MEDICARE

## 2022-01-04 ENCOUNTER — CLINICAL SUPPORT (OUTPATIENT)
Dept: REHABILITATION | Facility: HOSPITAL | Age: 72
End: 2022-01-04
Payer: MEDICARE

## 2022-01-04 DIAGNOSIS — R26.89 DECREASED FUNCTIONAL MOBILITY: ICD-10-CM

## 2022-01-04 DIAGNOSIS — R26.9 GAIT ABNORMALITY: ICD-10-CM

## 2022-01-04 DIAGNOSIS — R26.89 BALANCE PROBLEM: ICD-10-CM

## 2022-01-04 PROCEDURE — 97112 NEUROMUSCULAR REEDUCATION: CPT | Mod: PN,CQ

## 2022-01-04 PROCEDURE — 97110 THERAPEUTIC EXERCISES: CPT | Mod: PN,CQ

## 2022-01-04 PROCEDURE — 97116 GAIT TRAINING THERAPY: CPT | Mod: PN,CQ

## 2022-01-04 NOTE — PROGRESS NOTES
[facility]   Physical Therapy Treatment Note     Name: Kari Torres  Clinic Number: [id number]    Therapy Diagnosis:   Encounter Diagnoses   Name Primary?    Gait abnormality     Balance problem     Decreased functional mobility      Physician: Breonna Otto MD    Visit Date: 1/4/2022    Physician Orders: PT Eval and Treat   Medical Diagnosis from Referral: Balance problem  Evaluation Date: 10/19/2021  Authorization Period Expiration: 12/31/22  Plan of Care Expiration: 1/31/21  Visit # / Visits authorized: 1 / 20 (+11 from eval)  PTA Visit #: 1/5      Time In: 2:03  Time Out: 3:00  Total Billable Time: 55 minutes     Precautions: falls      SUBJECTIVE     Pt reports: feeling good today, not having any pain or soreness. States she was very active earlier today though, did some errands and also some exercises this morning. Leaving town this coming Monday and will not be back in town until after [place].  She was compliant with home exercise program.  Response to previous treatment: felt okay, mostly tired  Functional change: walking better    Pain: 0/10  Location: pain not really a factor     OBJECTIVE     Objective Measures updated at progress report unless specified.   Ambulates into clinic with RW away from her, forward flexed    Treatment     Charlene received the treatments listed below:      neuromuscular re-education activities to improve: Balance for 20 minutes. The following activities were included:    SLS with finger taps // bars x30 sec ea  tandem balance 2x 20 each each  Tandem walking 2 laps CGA  Forward march/backward walk x 4 laps in // bars  side steps without UE support 3 laps in // bars   Standing march in // bars x 15 each, UE taps on // bars as needed for balance  (NP) Modified Tandem stance x30 sec ea (no UE assist)  Seated VOR and nods x20 ea  Narrow BONI balance with head turns and nods, eyes on dot x 30 sec each    therapeutic activities to improve  "functional performance for 10 minutes, including:  Sit to stand from 18in chair x10 requires hands on knees--performed at end of session and pt able to perform with some fatigue noted at end of reps  Heel tapping on 6 in  x 20 each without UE assist, CGA - with verbal cue to see foot clear step  NP Forward step up 4" step without UE assist x 10 each foot, CGA  NP Forward step return on floor x 10 each  NP Lateral step return with light ball reach x 7 each - most difficult toward the R, moderate fatigue    gait training to improve functional mobility and safety for 15 minutes, including:  Pt ambulated 120 ft without AD on level surface and SBA (vc for safety with turns)  Pt ascended/descended 12 steps with B rails and SBA for safety (vc for hand placement on rails)  Pt ambulated 120 ft on sidewalks with RW, then up/down ramp with RW supervision, then up/down curb with RW and supervision    Patient Education and Home Exercises     Home Exercises Provided and Patient Education Provided     Education provided:   - proper use of walker, should use it AT ALL TIMES  - sequencing with SC  Pt gave verbal understanding to all education provided    Written Home Exercises Provided: Patient instructed to cont prior HEP. Exercises were reviewed and Charlene was able to demonstrate them prior to the end of the session.  Charlene demonstrated good  understanding of the education provided. See EMR under Patient Instructions for exercises provided during therapy sessions    ASSESSMENT     Charlene presents with overall good tolerance to progression of exercises well yet continues with moderate fatigue by end of session. Most difficulty with R foot clearance with heel taps on 6" step. Difficulty with single leg balance as well. Able to complete sit to stands without use of UEs but required cues for controlled eccentric motion. Pt will benefit from continued progression as able for improved endurance, balance, and strength.    Charlene is " progressing well towards her goals.   Pt prognosis is Good.     Pt will continue to benefit from skilled outpatient physical therapy to address the deficits listed in the problem list box on initial evaluation, provide pt/family education and to maximize pt's level of independence in the home and community environment.     Pt's spiritual, cultural and educational needs considered and pt agreeable to plan of care and goals.     Anticipated barriers to physical therapy: anxiety and depression, fear of leaving home    Goals:   Short Term Goals:  6 weeks (progressing, not met)  1. Pt will ambulate 300ft with appropriate AD including pivot turns mod I. (met with RW)  2. Pt will ascend/descend 8 steps with 1 rail and supervision. (progressing, not met)  3. Pt will ambulate with appropriate AD on ramps and curbs mod I. (met)  4. Pt will increase R LE strength by 1/3 muscle grade in all deficient planes for increased ease with ADLs and work activities. (partially met)  5. Pt will increase L LE strength by 1/3 muscle grade in all deficient planes for increased ease with ADLs and work activities. (partially met)  6. Pt will be independent and consistent with issued HEP in order to show carryover between therapy sessions. (met)     Long Term Goals: 12 weeks  1. Pt will ambulate 300ft on level and unlevel surface with SC mod I. (met with RW)  2. Pt will ascend/descend 12 steps with 1 rail mod I. (progressing, requires SBA)  3. Pt will increased B LE strength to 5/5 in all planes in order to increase tolerance to functional activities and ADLs. (partially met)  4. Pt will ascend/descend curb step with SC mod I. (met with RW)  5. Pt will be independent with updated HEP to maintain gains following discharge with therapy.     Unmet goals remain appropriate within 4 weeks.      PLAN     Continue with current POC toward established therapy goals.  Progress balance training and gait training as tolerated.    Doris Macias, PTA

## 2022-01-07 ENCOUNTER — HOSPITAL ENCOUNTER (OUTPATIENT)
Dept: RADIOLOGY | Facility: HOSPITAL | Age: 72
Discharge: HOME | End: 2022-01-07
Attending: NURSE PRACTITIONER
Payer: MEDICARE

## 2022-01-07 DIAGNOSIS — R47.9 SPEECH DISTURBANCE, UNSPECIFIED TYPE: ICD-10-CM

## 2022-01-07 DIAGNOSIS — R29.6 FREQUENT FALLS: ICD-10-CM

## 2022-01-07 PROCEDURE — 70551 MRI BRAIN STEM W/O DYE: CPT | Mod: 26,,, | Performed by: RADIOLOGY

## 2022-01-07 PROCEDURE — 70551 MRI BRAIN WITHOUT CONTRAST: ICD-10-PCS | Mod: 26,,, | Performed by: RADIOLOGY

## 2022-01-07 PROCEDURE — 70551 MRI BRAIN STEM W/O DYE: CPT | Mod: TC,PO

## 2022-01-11 ENCOUNTER — CLINICAL SUPPORT (OUTPATIENT)
Dept: REHABILITATION | Facility: HOSPITAL | Age: 72
End: 2022-01-11
Payer: MEDICARE

## 2022-01-11 DIAGNOSIS — R26.9 GAIT ABNORMALITY: ICD-10-CM

## 2022-01-11 DIAGNOSIS — R26.89 DECREASED FUNCTIONAL MOBILITY: ICD-10-CM

## 2022-01-11 DIAGNOSIS — R26.89 BALANCE PROBLEM: ICD-10-CM

## 2022-01-11 PROCEDURE — 97530 THERAPEUTIC ACTIVITIES: CPT | Mod: PN,CQ

## 2022-01-11 PROCEDURE — 97116 GAIT TRAINING THERAPY: CPT | Mod: PN,CQ

## 2022-01-11 PROCEDURE — 97112 NEUROMUSCULAR REEDUCATION: CPT | Mod: PN,CQ

## 2022-01-11 NOTE — PROGRESS NOTES
[facility]   Physical Therapy Treatment Note     Name: Kari Torres  Clinic Number: [id number]    Therapy Diagnosis:   Encounter Diagnoses   Name Primary?    Gait abnormality     Balance problem     Decreased functional mobility      Physician: Breonna Otto MD    Visit Date: 1/11/2022    Physician Orders: PT Eval and Treat   Medical Diagnosis from Referral: Balance problem  Evaluation Date: 10/19/2021  Authorization Period Expiration: 12/31/22  Plan of Care Expiration: 1/31/21  Visit # / Visits authorized: 2 / 20 (+11 from eval)  PTA Visit #: 2/5      Time In: 2:05  Time Out: 3:00  Total Billable Time: 55 minutes     Precautions: falls      SUBJECTIVE     Pt reports: she was tired after her last session the other day but was not overly sore. Has been trying to do her exercises at home as best she can. States she ordered a 3 wheel walker that comes in this Thursday  She was compliant with home exercise program.  Response to previous treatment: felt okay, mostly tired  Functional change: walking better    Pain: 0/10  Location: pain not really a factor     OBJECTIVE     Objective Measures updated at progress report unless specified.     Ambulates into clinic with RW away from her, forward flexed    Treatment     Charlene received the treatments listed below:      neuromuscular re-education activities to improve: Balance for 25 minutes. The following activities were included:    SLS with finger taps // bars x30 sec ea  tandem balance 2x 20 each each  Tandem walking 2 laps CGA  (NP) Forward march/backward walk x 4 laps in // bars  side steps without UE support 3 laps in // bars   Standing march in // bars x 15 each, UE taps on // bars as needed for balance  (NP) Modified Tandem stance x30 sec ea (no UE assist)  Seated VOR and nods x20 ea  Narrow BONI balance with head turns and nods, eyes on dot x 30 sec each    therapeutic activities to improve functional performance for 15  "minutes, including:  Sit to stand from 18in chair x10 requires hands on knees--performed at beginning of session and pt able to perform with some fatigue noted at end of reps  Heel tapping on 6 in  x 20 each without UE assist, CGA - with visual and verbal cue to see foot clear step  Forward step up 4" step without UE assist x 10 each foot, CGA  NP Forward step return on floor x 10 each  NP Lateral step return with light ball reach x 7 each - most difficult toward the R, moderate fatigue    gait training to improve functional mobility and safety for 15 minutes, including:  (NP) Pt ambulated 120 ft without AD on level surface and SBA (vc for safety with turns)  Pt ascended/descended 12 steps with B rails and SBA for safety (vc for hand placement on rails)  Pt ambulated 120 ft on sidewalks with RW, then up/down ramp with RW supervision, then up/down curb with RW and supervision    Patient Education and Home Exercises     Home Exercises Provided and Patient Education Provided     Education provided:   - proper use of walker, should use it AT ALL TIMES  - sequencing with SC  Pt gave verbal understanding to all education provided    Written Home Exercises Provided: Patient instructed to cont prior HEP. Exercises were reviewed and Charlene was able to demonstrate them prior to the end of the session.  Charlene demonstrated good  understanding of the education provided. See EMR under Patient Instructions for exercises provided during therapy sessions    ASSESSMENT     Charlene with overall improvement of balance today however requires cues to avoid instinctually reaching out to use UEs on handrails with balance activities. After given cues for upright posture, balance strategies improved with less reaching of hands and greater ankle strategies. Continued difficulty with prevention of catching R toes on step when performing 6" heel taps. Able to perform 4" step ups without UE assistance today, notes slightly greater confidence in LE " strength. Continues to require use of walker for safety with community ambulation on ramps and up/down curbs. Pt will benefit from continued progression as able for improved endurance, balance, and strength.    Charlene is progressing well towards her goals.   Pt prognosis is Good.     Pt will continue to benefit from skilled outpatient physical therapy to address the deficits listed in the problem list box on initial evaluation, provide pt/family education and to maximize pt's level of independence in the home and community environment.     Pt's spiritual, cultural and educational needs considered and pt agreeable to plan of care and goals.     Anticipated barriers to physical therapy: anxiety and depression, fear of leaving home    Goals:   Short Term Goals:  6 weeks (progressing, not met)  1. Pt will ambulate 300ft with appropriate AD including pivot turns mod I. (met with RW)  2. Pt will ascend/descend 8 steps with 1 rail and supervision. (progressing, not met)  3. Pt will ambulate with appropriate AD on ramps and curbs mod I. (met)  4. Pt will increase R LE strength by 1/3 muscle grade in all deficient planes for increased ease with ADLs and work activities. (partially met)  5. Pt will increase L LE strength by 1/3 muscle grade in all deficient planes for increased ease with ADLs and work activities. (partially met)  6. Pt will be independent and consistent with issued HEP in order to show carryover between therapy sessions. (met)     Long Term Goals: 12 weeks  1. Pt will ambulate 300ft on level and unlevel surface with SC mod I. (met with RW)  2. Pt will ascend/descend 12 steps with 1 rail mod I. (progressing, requires SBA)  3. Pt will increased B LE strength to 5/5 in all planes in order to increase tolerance to functional activities and ADLs. (partially met)  4. Pt will ascend/descend curb step with SC mod I. (met with RW)  5. Pt will be independent with updated HEP to maintain gains following discharge with  therapy.     Unmet goals remain appropriate within 4 weeks.      PLAN     Continue with current POC toward established therapy goals.  Progress balance training and gait training as tolerated.    Doris Macias, PTA

## 2022-01-18 ENCOUNTER — OFFICE VISIT (OUTPATIENT)
Dept: NEUROLOGY | Facility: CLINIC | Age: 72
End: 2022-01-18
Payer: MEDICARE

## 2022-01-18 DIAGNOSIS — R26.89 BALANCE PROBLEM: ICD-10-CM

## 2022-01-18 DIAGNOSIS — R29.6 FREQUENT FALLS: ICD-10-CM

## 2022-01-18 DIAGNOSIS — R47.9 SPEECH DISTURBANCE, UNSPECIFIED TYPE: Primary | ICD-10-CM

## 2022-01-18 PROCEDURE — 99214 PR OFFICE/OUTPT VISIT, EST, LEVL IV, 30-39 MIN: ICD-10-PCS | Mod: 95,,, | Performed by: NURSE PRACTITIONER

## 2022-01-18 PROCEDURE — 99214 OFFICE O/P EST MOD 30 MIN: CPT | Mod: 95,,, | Performed by: NURSE PRACTITIONER

## 2022-01-18 NOTE — ASSESSMENT & PLAN NOTE
Increased falls since Feb 2021. Pt denies any specific pattern or reason to her falls but does feel off balance. She has been participating in balance therapy which has helped a lot.   - referral to continue  Previous Neuro exam without focal deficits. Her gait is somewhat mechanical as she has suffered previous knee surgeries & OA. There is no ataxia, tremor or weakness on exam.    - would like to see pt in person next visit to compare  Her last fall was in December  Recent MRI brain scan without abnormality.   CTA noted with thyroid nodules   - pt following PCP for this

## 2022-01-18 NOTE — ASSESSMENT & PLAN NOTE
Pt states she may have trouble finding her words at times   Pt believes her speech is slowly improving but still an issue  Referral to ST  Consider memory w/u at next visit

## 2022-01-18 NOTE — PROGRESS NOTES
NEUROLOGY  Outpatient Follow Up    [facility]  [address] [facility] [address], [place] [address]  [phone] (office) / [phone] (fax)    Patient Name:  Kari Torres  :  1950  MR #:  [id number]  Acct #:  [id number]    Date of Neurology Visit: 2022  Name of Provider: ANEL Persaud    Other Physicians:  Breonna Otto MD (Primary Care Physician); No ref. provider found (Referring)      Chief Complaint: Follow-up      History of Present Illness (HPI):  Kari Torres is a right handed 71 y.o. female.    Patient is here today for frequent falls.  Daughter is accompanied via phone. She believes this started around February. She states she has been struggling with balance and has been seeing vestibular therapy with noted improvment. Daughter also reports slurred speech that started about 1 year ago along with poor handwriting. She has trouble finding her words. Her last fall was about 2 months ago. She is now using her walker and cane more diligently since receiving balance therapy. She states there is no pattern to her falls and denies LOC.        Interval Hx 2022:   This is a virtual appt  She is following up on balance and speech issues. She states her last fall was in late December, stating she lost her balance. She is still participating in outpatient PT and believe it is helping a lot. She states her speech is improving somewhat but still has word finding issues. Recent Neuro testing discussed at length.   CTA was noted with thyroid nodule and has followed up with her PCP for this. She is scheduled for a biopsy in the near future.           Past Medical, Surgical, Family & Social History:   Reviewed and updated.    Home Medications:     Current Outpatient Medications:     acetaminophen (TYLENOL) 325 MG tablet, Take 325 mg by mouth every 6 (six) hours as needed for Pain., Disp: , Rfl:     ALPRAZolam (XANAX) 0.5 MG tablet, Take 1 tablet (0.5 mg total)  by mouth every 4 (four) hours as needed for Anxiety., Disp: 30 tablet, Rfl: 0    ascorbic acid, vitamin C, (VITAMIN C) 500 MG tablet, Take 500 mg by mouth once daily., Disp: , Rfl:     cyanocobalamin, vitamin B-12, (VITAMIN B-12 ORAL), Take by mouth., Disp: , Rfl:     DULoxetine (CYMBALTA) 60 MG capsule, Take 1 capsule (60 mg total) by mouth once daily., Disp: 30 capsule, Rfl: 11    ferrous sulfate 324 mg (65 mg iron) TbEC, Take 1 tablet (324 mg total) by mouth 2 (two) times daily., Disp: , Rfl: 0    folic acid/multivit-min/lutein (CENTRUM SILVER ORAL), Take 1 tablet by mouth once daily., Disp: , Rfl:     metoprolol succinate (TOPROL-XL) 25 MG 24 hr tablet, Take 1 tablet (25 mg total) by mouth 2 (two) times daily., Disp: 180 tablet, Rfl: 3    temazepam (RESTORIL) 15 mg Cap, Take 1 capsule (15 mg total) by mouth once daily., Disp: 30 capsule, Rfl: 5    traMADoL (ULTRAM) 50 mg tablet, TAKE 1 2 TO 1 (ONE HALF TO ONE) TABLET BY MOUTH ONCE DAILY AS NEEDED, Disp: , Rfl:     vitamin D (VITAMIN D3) 1000 units Tab, Take 1,000 Units by mouth once daily., Disp: , Rfl:   No current facility-administered medications for this visit.    Facility-Administered Medications Ordered in Other Visits:     triamcinolone acetonide injection 40 mg, 40 mg, Intra-articular, , Jodi Mcconnell MD, 40 mg at 05/02/18 1132    Physical Examination:  No vital signs taken as this was a virtual appt.     GENERAL:  General appearance: Well, non-toxic appearing.  No apparent distress.  Neck: supple.  .     MENTAL STATUS:  Alertness, attention span & concentration: normal.  Language: normal.  Orientation to self, place & time:  normal.  Memory, recent & remote: normal.  Fund of knowledge: normal.       SPEECH:  Clear and fluent.  Follows complex commands.         Diagnostic Data Reviewed:     CT head 10/26/2021:  FINDINGS:  No hemorrhage, mass effect or CT evidence of acute cortical infarction.  White-matter hypodensities are nonspecific but  likely related to small vessel ischemic disease.  Ventricles and sulci are proportionate.     No abnormal extra-axial fluid collections.     No hyperdense artery or vein.  Intracranial arteries are partially calcified.     No skull fracture or aggressive osseous process.  Mastoid air cells are clear.  Mild mucosal thickening is in the right sphenoid sinus.     Impression:     No acute intracranial findings.     TTE 12/2020:  · Concentric remodeling and normal systolic function. The estimated ejection fraction is 60%  · Moderate left atrial enlargement.  · Grade II left ventricular diastolic dysfunction.  · Normal right ventricular size with normal right ventricular systolic function.  · Mild pulmonic regurgitation.     CTA 12/17/2021  Impression:  1. No evidence of an acute intracranial abnormality.  2.   Generalized cerebral volume loss and nonspecific supratentorial white matter hypoattenuation most likely representing mild chronic microvascular ischemic change.  3. CT evaluation of the head neck vasculature demonstrates no high-grade stenosis, large vessel occlusion, or aneurysm.  4. Multinodular thyroid gland.  Further evaluation with dedicated thyroid ultrasound is recommended, if not already performed.  Yellow Thyroid 2015 Alert:        MRI brain 1/7/2022  FINDINGS:  Ventricles and sulci are normal in size for age without evidence of hydrocephalus. No extra-axial blood or fluid collections.     Scattered foci of T2/FLAIR hyperintense signal involving the supratentorial white matter, nonspecific but probably representing a mild degree of chronic microvascular ischemic change.  Diffusion-weighted images demonstrate no evidence of an acute infarct. Bilateral basal ganglia calcification.  Susceptibility weighted images demonstrate no evidence of acute or chronic hemorrhage. No mass effect or midline shift.     Normal vascular flow voids are preserved.     Bone marrow signal intensity is normal. Paranasal sinuses  and mastoid air cells are essentially clear. Left lens replacement.     Impression:     1. No acute intracranial abnormality.  2.  Mild generalized cerebral volume loss with scattered supratentorial white matter T2/FLAIR hyperintensity, nonspecific but likely representing chronic microvascular ischemic change.                 Assessment and Plan:      Problem List Items Addressed This Visit        Neuro    Speech disturbance - Primary    Current Assessment & Plan     Pt states she may have trouble finding her words at times   Pt believes her speech is slowly improving but still an issue  Referral to ST  Consider memory w/u at next visit            Other    Balance problem    Frequent falls    Current Assessment & Plan     Increased falls since Feb 2021. Pt denies any specific pattern or reason to her falls but does feel off balance. She has been participating in balance therapy which has helped a lot.   - referral to continue  Previous Neuro exam without focal deficits. Her gait is somewhat mechanical as she has suffered previous knee surgeries & OA. There is no ataxia, tremor or weakness on exam.    - would like to see pt in person next visit to compare  Her last fall was in December  Recent MRI brain scan without abnormality.   CTA noted with thyroid nodules   - pt following PCP for this                                 Important to note, also  has a past medical history of Anemia, B12 deficiency, Hearing loss, Hypertension, PONV (postoperative nausea and vomiting), Primary osteoarthritis of right knee, S/P mitral valve repair, and SVT (supraventricular tachycardia).          The patient will return to clinic in 2 months     All questions were answered and patient is comfortable with the plan.         Thank you very much for the opportunity to assist in this patient's care.    If you have any questions or concerns, please do not hesitate to contact me at any time.      Sincerely,     Allison Gardner, FNP-C [facility]       The patient location is: [place], [place]  The chief complaint leading to consultation is: follow up speech and falls    Visit type: audiovisual    Face to Face time with patient: 32 minutes of total time spent on the encounter, which includes face to face time and non-face to face time preparing to see the patient (eg, review of tests), Obtaining and/or reviewing separately obtained history, Documenting clinical information in the electronic or other health record, Independently interpreting results (not separately reported) and communicating results to the patient/family/caregiver, or Care coordination (not separately reported).         Each patient to whom he or she provides medical services by telemedicine is:  (1) informed of the relationship between the physician and patient and the respective role of any other health care provider with respect to management of the patient; and (2) notified that he or she may decline to receive medical services by telemedicine and may withdraw from such care at any time.    Notes:

## 2022-01-19 ENCOUNTER — CLINICAL SUPPORT (OUTPATIENT)
Dept: REHABILITATION | Facility: HOSPITAL | Age: 72
End: 2022-01-19
Payer: MEDICARE

## 2022-01-19 DIAGNOSIS — R26.89 DECREASED FUNCTIONAL MOBILITY: ICD-10-CM

## 2022-01-19 DIAGNOSIS — R26.9 GAIT ABNORMALITY: ICD-10-CM

## 2022-01-19 DIAGNOSIS — R26.89 BALANCE PROBLEM: ICD-10-CM

## 2022-01-19 PROCEDURE — 97116 GAIT TRAINING THERAPY: CPT | Mod: PN,CQ

## 2022-01-19 PROCEDURE — 97110 THERAPEUTIC EXERCISES: CPT | Mod: PN,CQ

## 2022-01-19 PROCEDURE — 97112 NEUROMUSCULAR REEDUCATION: CPT | Mod: PN,CQ

## 2022-01-19 NOTE — PROGRESS NOTES
[facility]   Physical Therapy Treatment Note     Name: Kari Torres  Clinic Number: [id number]    Therapy Diagnosis:   Encounter Diagnoses   Name Primary?    Gait abnormality     Balance problem     Decreased functional mobility      Physician: Breonna Otto MD    Visit Date: 1/19/2022    Physician Orders: PT Eval and Treat   Medical Diagnosis from Referral: Balance problem  Evaluation Date: 10/19/2021  Authorization Period Expiration: 12/31/22  Plan of Care Expiration: 1/31/21  Visit # / Visits authorized: 2 / 20 (+11 from eval)  PTA Visit #: 2/5      Time In: 12:07  Time Out: 1:00  Total Billable Time: 53 minutes     Precautions: falls      SUBJECTIVE     Pt reports: her ankles were tired after her last session. Some tightness and soreness along the posterior proximal hamstring/distal buttock. Using rollator walker at night, no assistive device in the home throughout the day, and her new 3 wheel walker in community.   She was compliant with home exercise program.  Response to previous treatment: felt okay, mostly tired  Functional change: walking better    Pain: 0/10  Location: pain not really a factor     OBJECTIVE     Objective Measures updated at progress report unless specified.     Ambulates into clinic with RW away from her, forward flexed    Treatment     Charlene received the treatments listed below:      neuromuscular re-education activities to improve: Balance for 15 minutes. The following activities were included:    SLS with finger taps // bars x30 sec ea (<5 sec longest hold on L, <3 sec longest hold on R)  tandem balance 2x 20 each each (min UE assist)  Tandem walking 2 laps CGA  (NP) Forward march/backward walk x 4 laps in // bars  (NP) side steps without UE support 3 laps in // bars   Standing march in // bars x 15 each, UE taps on // bars as needed for balance  (NP) Seated VOR and nods x20 ea  Narrow BONI balance with head turns and nods, eyes on dot x 30  "sec each    therapeutic activities to improve functional performance for 20 minutes, including:  Sit to stand from 18in chair x10 requires hands on knees--performed after neuromuscular re-education exercises and noted increased fatigue, required rest after 5 and use of hands for last 5  Heel tapping on 6 in  x 20 each without UE assist, CGA - with visual and verbal cue to see foot clear step  Forward step up 4" step without UE assist x 10 each foot, CGA  NP Forward step return on floor x 10 each  NP Lateral step return with light ball reach x 7 each - most difficult toward the R, moderate fatigue    gait training to improve functional mobility and safety for 18 minutes, including:  (NP) Pt ambulated 120 ft without AD on level surface and SBA (vc for safety with turns)  Pt ascended/descended 12 steps with B rails and SBA for safety (vc for hand placement on rails)  Pt ambulated 120 ft on sidewalks with tri-wheel rollator, then up/down ramp with RW supervision, then up/down several curbs with education on safety with her new 3 wheeled rollator    Patient Education and Home Exercises     Home Exercises Provided and Patient Education Provided     Education provided:   - proper use of new walker, should use it AT ALL TIMES  -   Pt gave verbal understanding to all education provided    Written Home Exercises Provided: Patient instructed to cont prior HEP. Exercises were reviewed and Charlene was able to demonstrate them prior to the end of the session.  Charlene demonstrated good  understanding of the education provided. See EMR under Patient Instructions for exercises provided during therapy sessions    ASSESSMENT     Charlene presents with mild increase of fatigue today, possibly due to pt reporting today not being a good day. Required increased use of hands with sit to stands today due to fatigue. Decreased stability during balance activities with more narrow stance or single leg stance. Still able to complete forward step ups " "on 4" step with minimal to no use of hands on // bars. Overall good understanding of walker safety with new walker; instruction given on use of brakes for quick use and locking, use with navigating ramps and proper safety with ascending/descending curbs for community use. Pt will benefit from continued progression as able for improved endurance, balance, and strength.    Charlene is progressing well towards her goals.   Pt prognosis is Good.     Pt will continue to benefit from skilled outpatient physical therapy to address the deficits listed in the problem list box on initial evaluation, provide pt/family education and to maximize pt's level of independence in the home and community environment.     Pt's spiritual, cultural and educational needs considered and pt agreeable to plan of care and goals.     Anticipated barriers to physical therapy: anxiety and depression, fear of leaving home    Goals:   Short Term Goals:  6 weeks   1. Pt will ambulate 300ft with appropriate AD including pivot turns mod I. (met with RW)  2. Pt will ascend/descend 8 steps with 1 rail and supervision. (progressing, not met)  3. Pt will ambulate with appropriate AD on ramps and curbs mod I. (met)  4. Pt will increase R LE strength by 1/3 muscle grade in all deficient planes for increased ease with ADLs and work activities. (partially met)  5. Pt will increase L LE strength by 1/3 muscle grade in all deficient planes for increased ease with ADLs and work activities. (partially met)  6. Pt will be independent and consistent with issued HEP in order to show carryover between therapy sessions. (met)     Long Term Goals: 12 weeks  1. Pt will ambulate 300ft on level and unlevel surface with SC mod I. (met with RW)  2. Pt will ascend/descend 12 steps with 1 rail mod I. (progressing, requires SBA)  3. Pt will increased B LE strength to 5/5 in all planes in order to increase tolerance to functional activities and ADLs. (partially met)  4. Pt will " ascend/descend curb step with SC mod I. (met with RW)  5. Pt will be independent with updated HEP to maintain gains following discharge with therapy.     Unmet goals remain appropriate within 4 weeks.      PLAN     Continue with current POC toward established therapy goals.  Progress balance training and gait training as tolerated.    Doris Macias, PTA

## 2022-01-20 ENCOUNTER — TELEPHONE (OUTPATIENT)
Dept: NEUROLOGY | Facility: CLINIC | Age: 72
End: 2022-01-20
Payer: MEDICARE

## 2022-01-20 NOTE — TELEPHONE ENCOUNTER
Returned patient's call to schedule 2 mos follow up for memory with AUGUSTUS Persaud on 03/16/22 at 2:00 Pm.  Patient verbalized understanding of this             ----- Message from Aaron Jung sent at 1/20/2022  9:49 AM CST -----  Regarding: ret call  Type:  Patient Returning Call    Who Called:  Kari    Who Left Message for Patient:  Rosa Maria GILLIS    Does the patient know what this is regarding?:  to schedule 1hr memory with CRIS Esparza in 2m    Best Call Back Number:  [phone]     Additional Information:

## 2022-01-20 NOTE — TELEPHONE ENCOUNTER
Called and left message for patient to return call.  Per Rosa Maria Esparza, NP patient is needing to be scheduled for a 2 month follow up - memory appointment slot (1 hour) .

## 2022-01-25 ENCOUNTER — CLINICAL SUPPORT (OUTPATIENT)
Dept: REHABILITATION | Facility: HOSPITAL | Age: 72
End: 2022-01-25
Payer: MEDICARE

## 2022-01-25 DIAGNOSIS — R26.9 GAIT ABNORMALITY: ICD-10-CM

## 2022-01-25 DIAGNOSIS — R26.89 BALANCE PROBLEM: ICD-10-CM

## 2022-01-25 DIAGNOSIS — R29.6 FREQUENT FALLS: ICD-10-CM

## 2022-01-25 DIAGNOSIS — R26.89 DECREASED FUNCTIONAL MOBILITY: ICD-10-CM

## 2022-01-25 NOTE — PLAN OF CARE
[facility]   Discharge Summary and Physical Therapy Treatment Note     Name: Kari Torres  Clinic Number: [id number]    Therapy Diagnosis:   Encounter Diagnoses   Name Primary?    Frequent falls     Balance problem     Gait abnormality     Decreased functional mobility      Physician: Breonna Otto MD    Visit Date: 1/25/2022    Physician Orders: PT Eval and Treat   Medical Diagnosis from Referral: Balance problem  Evaluation Date: 10/19/2021  Authorization Period Expiration: 12/31/22  Plan of Care Expiration: 1/31/21  Visit # / Visits authorized: 2 / 20 (+11 from eval)  PTA Visit #: 2/5      Time In: 1:00  Time Out: 1:40  Total Billable Time: 40 minutes    Cancels: 3  No shows: 0     Precautions: falls      SUBJECTIVE     Pt reports: she uses her 3 wheeled walker in the community and at night.  She was compliant with home exercise program.  Response to previous treatment: felt okay, mostly tired  Functional change: walking better    Pain: 0/10  Location: pain not really a factor     OBJECTIVE     Lower Extremity Strength  Left LE   Right LE     Knee extension: 5/5 Knee extension: 5/5   Knee flexion: 5/5 Knee flexion: 5/5   Hip flexion: 5/5 Hip flexion: 5/5   Hip extension:  4+/5 Hip extension: 4+/5   Hip abduction: 5/5 Hip abduction: 5/5   Hip adduction: 5/5 Hip adduction 5/5   Ankle dorsiflexion: 5/5 Ankle dorsiflexion: 5/5   Ankle plantarflexion: 5/5 Ankle plantarflexion: 5/5        FERRIS Assessment    1. Sitting to Standing   4 - able to stand without using hands and stabilize independently  2. Standing Unsupported   4 - able to stand safely 2 minutes without hold  3. Sitting Unsupported   4 - able to sit safely and securely 2 minutes  4. Standing to Sitting   4 - sits safely with minimal use of hands  5. Pivot Transfer   3 - able to transfer safely with definite use of hands  6. Standing with Eyes Closed   3 - able to stand 10 seconds with supervision  7. Standing  with Feet Together   3 - able to place feet together independently and stand for 1 minute with supervision  8. Reaching Forward with Outstretched Arm   3 - can reach forward 12 cm/5 inches safely  9. Retrieving Object from Floor   4 - able to  slipper safely and easily  10. Turning to Look Behind   4 - looks behind from both sides and weights shifts well  11. Turning 360 Degrees   2 - able to turn 360 safely but slowly  12. Placing Alternate Foot on Step   3 - able to stand independently and completely 8 steps > 20 seconds  13. Standing with One Foot in Front   3 - able to place foot ahead of other independently and hold 30 seconds  14. Standing on One Foot   1 - tries to lift leg and unable to hold 3 seconds but remains standing independently  45/56 (1/25/22)  41/56 (12/16/21)  44/56 (11/16/21)  Garduno Score: 36/56 (initial eval)     Function:  - SLS R: 2 sec  - SLS L: 5 sec  - Sit <--> Stand:from chair without UE support   - Bed Mobility: mod I for all aspects for increased time     Sensation: grossly intact to light touch    CMS Impairment/Limitation/Restriction for FOTO Muscle, Tendon + Soft Tissue Disorders Survey  Status Limitation G-Code CMS Severity Modifier  Intake 45% 45%  Predicted 52% 38% Goal Status+ CJ - At least 20 percent but less than 40 percent  1/25/2022 46% 54% Current Status CK - At least 40 percent but less than 60 percent    Treatment     Charlene received the treatments listed below:      Therapeutic exercises x 17 minutes:  The following exercises were included:  Reassessment  SLS with finger taps // bars x30 sec ea (<5 sec longest hold on L, <3 sec longest hold on R)    therapeutic activities to improve functional performance for 8 minutes, including:  Sit to stand from 18in chair x5 requires hands on knees  SPT from chair to mat without AD mod I (hands on arm rests)    gait training to improve functional mobility and safety for 15 minutes, including:  Pt ambulated 120 ft on sidewalks  with tri-wheel rollator, then up/down ramp with RW mod I, then up/down curb step with tri-wheel walker mod I.    Patient Education and Home Exercises     Home Exercises Provided and Patient Education Provided     Education provided:   - needs to use RW for safety with ambulation  Pt gave verbal understanding to all education provided    Written Home Exercises Provided: Patient instructed to cont prior HEP. Exercises were reviewed and Charlene was able to demonstrate them prior to the end of the session.  Charlene demonstrated good  understanding of the education provided. See EMR under Patient Instructions for exercises provided during therapy sessions    ASSESSMENT     Charlene reassessed today.  She met the majority of her goals.  She was unable to coordinate ambulating with SC safely, but demonstrates good safety and independence for ramps and curbs with use of tri-wheel walker.  She has improved with overall LE strength and balance since eval, but has maximized benefits of skilled PT at this time.  She is safe for discharge to continue to self manage with HEP and continue to ambulate with RW for safety.    Pt's spiritual, cultural and educational needs considered and pt agreeable to plan of care and goals.     Anticipated barriers to physical therapy: anxiety and depression, fear of leaving home    Goals:   Short Term Goals:  6 weeks   1. Pt will ambulate 300ft with appropriate AD including pivot turns mod I. (met with RW)  2. Pt will ascend/descend 8 steps with 1 rail and supervision. (not met)  3. Pt will ambulate with appropriate AD on ramps and curbs mod I. (met)  4. Pt will increase R LE strength by 1/3 muscle grade in all deficient planes for increased ease with ADLs and work activities. (met)  5. Pt will increase L LE strength by 1/3 muscle grade in all deficient planes for increased ease with ADLs and work activities. (met)  6. Pt will be independent and consistent with issued HEP in order to show carryover  between therapy sessions. (met)     Long Term Goals: 12 weeks  1. Pt will ambulate 300ft on level and unlevel surface with SC mod I. (met with RW)  2. Pt will ascend/descend 12 steps with 1 rail mod I. (requires SBA)  3. Pt will increased B LE strength to 5/5 in all planes in order to increase tolerance to functional activities and ADLs. (met in all planes except hip extension)  4. Pt will ascend/descend curb step with SC mod I. (met with RW)  5. Pt will be independent with updated HEP to maintain gains following discharge with therapy. (met)       PLAN     Patient discharged from PT to continue to self manage with HEP.  She has maximized benefits of PT at this time.    Sydni Wheatley, PT

## 2022-01-26 ENCOUNTER — PATIENT MESSAGE (OUTPATIENT)
Dept: NEUROLOGY | Facility: CLINIC | Age: 72
End: 2022-01-26
Payer: MEDICARE

## 2022-03-16 ENCOUNTER — OFFICE VISIT (OUTPATIENT)
Dept: NEUROLOGY | Facility: CLINIC | Age: 72
End: 2022-03-16
Payer: MEDICARE

## 2022-03-16 VITALS
WEIGHT: 189.69 LBS | DIASTOLIC BLOOD PRESSURE: 76 MMHG | RESPIRATION RATE: 18 BRPM | HEART RATE: 67 BPM | SYSTOLIC BLOOD PRESSURE: 117 MMHG | BODY MASS INDEX: 29.71 KG/M2

## 2022-03-16 DIAGNOSIS — R41.3 MEMORY LOSS: Primary | ICD-10-CM

## 2022-03-16 DIAGNOSIS — R29.6 FREQUENT FALLS: ICD-10-CM

## 2022-03-16 PROCEDURE — 99214 OFFICE O/P EST MOD 30 MIN: CPT | Mod: S$PBB,,, | Performed by: NURSE PRACTITIONER

## 2022-03-16 PROCEDURE — 99999 PR PBB SHADOW E&M-EST. PATIENT-LVL IV: CPT | Mod: PBBFAC,,, | Performed by: NURSE PRACTITIONER

## 2022-03-16 PROCEDURE — 99999 PR PBB SHADOW E&M-EST. PATIENT-LVL IV: ICD-10-PCS | Mod: PBBFAC,,, | Performed by: NURSE PRACTITIONER

## 2022-03-16 PROCEDURE — 99214 PR OFFICE/OUTPT VISIT, EST, LEVL IV, 30-39 MIN: ICD-10-PCS | Mod: S$PBB,,, | Performed by: NURSE PRACTITIONER

## 2022-03-16 PROCEDURE — 99214 OFFICE O/P EST MOD 30 MIN: CPT | Mod: PBBFAC,PO | Performed by: NURSE PRACTITIONER

## 2022-03-16 NOTE — ASSESSMENT & PLAN NOTE
Increased falls since Feb 2021. Pt denies any specific pattern or reason to her falls but does feel off balance. She completed balance therapy which has helped a lot.   - continue home exercises  Her gait is somewhat mechanical as she has suffered previous knee surgeries & OA. There is no ataxia, tremor or weakness on exam.     Recent MRI brain scan without abnormality.   CTA noted with thyroid nodules   - pt following PCP for this

## 2022-03-16 NOTE — PROGRESS NOTES
NEUROLOGY  Outpatient Follow Up    [facility]  [address] [facility], [place] [address]  [phone] (office) / [phone] (fax)    Patient Name:  Kari Torres  :  1950  MR #:  [id number]  Acct #:  [id number]    Date of Neurology Visit: 2022  Name of Provider: ANEL Persaud    Other Physicians:  Breonna Otto MD (Primary Care Physician); No ref. provider found (Referring)      Chief Complaint: Memory Loss      History of Present Illness (HPI):  Kari Torres is a right handed 71 y.o. female.    Patient is here today for frequent falls.  Daughter is accompanied via phone. She believes this started around February. She states she has been struggling with balance and has been seeing vestibular therapy with noted improvment. Daughter also reports slurred speech that started about 1 year ago along with poor handwriting. She has trouble finding her words. Her last fall was about 2 months ago. She is now using her walker and cane more diligently since receiving balance therapy. She states there is no pattern to her falls and denies LOC.        Interval Hx 2022:   This is a virtual appt  She is following up on balance and speech issues. She states her last fall was in late December, stating she lost her balance. She is still participating in outpatient PT and believe it is helping a lot. She states her speech is improving somewhat but still has word finding issues. Recent Neuro testing discussed at length.   CTA was noted with thyroid nodule and has followed up with her PCP for this. She is scheduled for a biopsy in the near future.       Interval Hx 3/16/2022:  Patient is here today for memory assessment. She is solo at today's visit. She states her speech has improved but will have word finding difficulty at times. She lives alone. When asked patient does not believe she has memory issues other than typical age related issues.     Patient's highest  "level of education completed was highschool and correspondence school. 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 She believes normal aging memory issues began about 1 year ago. She states she struggles with both short and long term memory loss. She denies personality or behavioral changes. She states her sister recently told her she was acting like her "old self" in a positive light. She denies depression and takes Cymbalta. She reports great sleep at night and denies daytime sleeping. She denies hallucinations. She reports being very organized but admits to not planning much since retired. She is managing her finances and doing well overall. She is also managing her own medications well. She denies issues with hygiene and able to perform ADLs without assistance. She has trouble finding her words but reports this has improved. She denies issues with comprehension. She denies urinary incontinence. Her last fall was 2 weeks ago stating she rushing to the bathroom due to taking a laxative. She is continuing with home exercises as she did plateau with outpatient therapy. She is still driving and denies recent MVAs or getting lost in familiar areas. She takes xanax very minimally for anxiety. She is seeing a virtual counselor once every 2 weeks for this. She does report taking an OTC medication from [employer] to aid with sleep. She cannot recall the name of this. House chores keeps her busy.     She states she still reports feeling off balance. She completed outpatient therapy and did well overall.         Past Medical, Surgical, Family & Social History:   Reviewed and updated.    Home Medications:     Current Outpatient Medications:     acetaminophen (TYLENOL) 325 MG tablet, Take 325 mg by mouth every 6 (six) hours as needed for Pain., Disp: , Rfl:     ALPRAZolam (XANAX) 0.5 MG tablet, Take 1 tablet (0.5 mg total) by mouth 4 (four) times daily as needed for Anxiety., Disp: 30 tablet, Rfl: 1    amoxicillin (AMOXIL) " "500 MG Tab, Take 4 prior to dental procedure, Disp: 4 tablet, Rfl: 5    ascorbic acid, vitamin C, (VITAMIN C) 500 MG tablet, Take 500 mg by mouth once daily., Disp: , Rfl:     cyanocobalamin, vitamin B-12, (VITAMIN B-12 ORAL), Take by mouth., Disp: , Rfl:     DULoxetine (CYMBALTA) 30 MG capsule, Take 1 capsule (30 mg total) by mouth once daily. Take with 60mg daily, Disp: 90 capsule, Rfl: 3    DULoxetine (CYMBALTA) 60 MG capsule, Take 1 capsule (60 mg total) by mouth once daily., Disp: 90 capsule, Rfl: 3    ferrous sulfate 324 mg (65 mg iron) TbEC, Take 1 tablet (324 mg total) by mouth 2 (two) times daily. (Patient taking differently: Take 325 mg by mouth once daily. Pt takes 1 tab every other day), Disp: , Rfl: 0    folic acid/multivit-min/lutein (CENTRUM SILVER ORAL), Take 1 tablet by mouth once daily., Disp: , Rfl:     metoprolol succinate (TOPROL-XL) 25 MG 24 hr tablet, Take 1 tablet (25 mg total) by mouth 2 (two) times daily., Disp: 180 tablet, Rfl: 3    temazepam (RESTORIL) 15 mg Cap, Take 1 capsule by mouth once daily, Disp: 30 capsule, Rfl: 5    traMADoL (ULTRAM) 50 mg tablet, TAKE 1 2 TO 1 (ONE HALF TO ONE) TABLET BY MOUTH ONCE DAILY AS NEEDED, Disp: , Rfl:     vitamin D (VITAMIN D3) 1000 units Tab, Take 1,000 Units by mouth once daily., Disp: , Rfl:   No current facility-administered medications for this visit.    Facility-Administered Medications Ordered in Other Visits:     triamcinolone acetonide injection 40 mg, 40 mg, Intra-articular, , Jodi Mcconnell MD, 40 mg at 05/02/18 1132    Physical Examination:    MMSE 3/16/2022   What is the (year), (season), (date), (day), (month)? 3   Where are we (state), (country), (town or city), (hospital), (floor)? 5   Name 3 common objects (eg. "apple", "table", "jennifer"). Take 1 second to say each. Then ask the patient to repeat all 3. Give 1 point for each correct answer. Then repeat them until he/she learns all 3. Count trials and record. 3   Serial 7's " "backwards. Stop after 5 answers. (100,93,86,79,72) or alternatively  spell "WORLD" backwards. (D..L..R..O..W). The score is the number of letters in correct order. 5   Ask for the 3 common objects named earlier in the exam. Give 1 point for each correct answer. 3   Name a "pencil" and "watch." 2   Repeat the following: "No ifs, ands, or buts." 1   Follow a 3-stage command: "Take a paper in your right hand, fold it in half, & put it on the floor." 3   Read and obey the following: (see paper exam) 1   Write a sentence. 1   Copy the following design: (see paper exam) 1   Total MMSE Score 28   Some recent data might be hidden       GENERAL:  General appearance: Well, non-toxic appearing.  No apparent distress.  Neck: supple.  .     MENTAL STATUS:  Alertness, attention span & concentration: normal.  Language: normal.  Orientation to self, place & time:  normal.  Memory, recent & remote: normal.  Fund of knowledge: normal.  MMSE:28/30     SPEECH:  Clear and fluent.  Follows complex commands.     CRANIAL NERVES:  Cranial Nerves II-XII were examined.  II - Visual fields: normal.  III, IV, VI: PERRL, EOMI, No ptosis, No nystagmus.  V - Facial sensation: normal.  VII - Face symmetry & mobility: not assessed d/t COVID precautions  VIII - Hearing: normal.  IX, X - Palate: mobile & midline.  XI - Shoulder shrug: normal.  XII - Tongue protrusion: not assessed d/t COVID precautions     GROSS MOTOR:  Gait & station: mechanical; non focal; narrow based with minimal bending of the knees; good step height and arm swing  Tone: no cogwheel  Abnormal movements: none.  Finger-nose: normal.  Rapid alternating movements: normal.  Pronator drift: normal        MUSCLE STRENGTH:      Fascics Atrophy RIGHT      LEFT Atrophy Fascics       5 Biceps 5           5 Triceps 5           5 Forearm.Pr. 5           5 Finger Ext. 5           5 Finger Flex 5                           5 Iliopsoas flex    5           5 Hip Abduct 5           5 Hip Adduct 5   "         5 Quads 5           5 Hams 5           5 Dorsiflex 5           5 Plantar Flex 5          REFLEXES:     RIGHT Reflex    LEFT   2+ Biceps 2+   2+ Brachiorad. 2+           3+ Patellar 3+      SENSORY:  Light touch: Normal throughout.               Diagnostic Data Reviewed:   Component      Latest Ref Rng & Units 3/14/2022   TSH      0.400 - 4.000 uIU/mL 1.920   Vitamin B-12      210 - 950 pg/mL 274     CT head 10/26/2021:  FINDINGS:  No hemorrhage, mass effect or CT evidence of acute cortical infarction.  White-matter hypodensities are nonspecific but likely related to small vessel ischemic disease.  Ventricles and sulci are proportionate.     No abnormal extra-axial fluid collections.     No hyperdense artery or vein.  Intracranial arteries are partially calcified.     No skull fracture or aggressive osseous process.  Mastoid air cells are clear.  Mild mucosal thickening is in the right sphenoid sinus.     Impression:     No acute intracranial findings.     TTE 12/2020:  · Concentric remodeling and normal systolic function. The estimated ejection fraction is 60%  · Moderate left atrial enlargement.  · Grade II left ventricular diastolic dysfunction.  · Normal right ventricular size with normal right ventricular systolic function.  · Mild pulmonic regurgitation.     CTA 12/17/2021  Impression:  1. No evidence of an acute intracranial abnormality.  2.   Generalized cerebral volume loss and nonspecific supratentorial white matter hypoattenuation most likely representing mild chronic microvascular ischemic change.  3. CT evaluation of the head neck vasculature demonstrates no high-grade stenosis, large vessel occlusion, or aneurysm.  4. Multinodular thyroid gland.  Further evaluation with dedicated thyroid ultrasound is recommended, if not already performed.  Yellow Thyroid 2015 Alert:        MRI brain 1/7/2022  FINDINGS:  Ventricles and sulci are normal in size for age without evidence of hydrocephalus. No  extra-axial blood or fluid collections.     Scattered foci of T2/FLAIR hyperintense signal involving the supratentorial white matter, nonspecific but probably representing a mild degree of chronic microvascular ischemic change.  Diffusion-weighted images demonstrate no evidence of an acute infarct. Bilateral basal ganglia calcification.  Susceptibility weighted images demonstrate no evidence of acute or chronic hemorrhage. No mass effect or midline shift.     Normal vascular flow voids are preserved.     Bone marrow signal intensity is normal. Paranasal sinuses and mastoid air cells are essentially clear. Left lens replacement.     Impression:  1. No acute intracranial abnormality.  2.  Mild generalized cerebral volume loss with scattered supratentorial white matter T2/FLAIR hyperintensity, nonspecific but likely representing chronic microvascular ischemic change.                 Assessment and Plan:    Problem List Items Addressed This Visit        Neuro    Memory loss - Primary    Current Assessment & Plan     Patient is a 70 y/o with mild word finding issues and believes her memory issues are age related.   She denies behavioral changes, depression, sleep disturbances, hallucinations.  MMSE today 28/30   - suspect normal aging process vs MCI  MRI brain scan reports mild generalized cerebral volume loss   Recent B12 level on the low side   - pt now taking supplement  Cognitive enhancing medication not warranted at this time.    - consider initiation of Aricept in future  Also consider further NP testing   There are no safety concerns                Other    Frequent falls    Current Assessment & Plan     Increased falls since Feb 2021. Pt denies any specific pattern or reason to her falls but does feel off balance. She completed balance therapy which has helped a lot.   - continue home exercises  Her gait is somewhat mechanical as she has suffered previous knee surgeries & OA. There is no ataxia, tremor or  weakness on exam.     Recent MRI brain scan without abnormality.   CTA noted with thyroid nodules   - pt following PCP for this                              Important to note, also  has a past medical history of Anemia, B12 deficiency, Hearing loss, Hypertension, PONV (postoperative nausea and vomiting), Primary osteoarthritis of right knee, S/P mitral valve repair, and SVT (supraventricular tachycardia).          The patient will return to clinic in 6 months     All questions were answered and patient is comfortable with the plan.         Thank you very much for the opportunity to assist in this patient's care.    If you have any questions or concerns, please do not hesitate to contact me at any time.      Sincerely,     ANEL Persaud  [facility] [place]       I spent a total of 35 minutes on the day of the visit.This includes face to face time and non-face to face time preparing to see the patient (eg, review of tests), Obtaining and/or reviewing separately obtained history, Documenting clinical information in the electronic or other health record, Independently interpreting resultsand communicating results to the patient/family/caregiver, or Care coordination.

## 2022-03-18 PROBLEM — R41.3 MEMORY LOSS: Status: ACTIVE | Noted: 2022-03-18

## 2022-03-18 NOTE — ASSESSMENT & PLAN NOTE
Patient is a 72 y/o with mild word finding issues and believes her memory issues are age related.   She denies behavioral changes, depression, sleep disturbances, hallucinations.  MMSE today 28/30   - suspect normal aging process vs MCI  MRI brain scan reports mild generalized cerebral volume loss   Recent B12 level on the low side   - pt now taking supplement  Cognitive enhancing medication not warranted at this time.    - consider initiation of Aricept in future  Also consider further NP testing   There are no safety concerns

## 2022-08-10 ENCOUNTER — TELEPHONE (OUTPATIENT)
Dept: OPHTHALMOLOGY | Facility: CLINIC | Age: 72
End: 2022-08-10
Payer: MEDICARE

## 2022-08-12 ENCOUNTER — TELEPHONE (OUTPATIENT)
Dept: OPHTHALMOLOGY | Facility: CLINIC | Age: 72
End: 2022-08-12
Payer: MEDICARE

## 2022-08-12 NOTE — TELEPHONE ENCOUNTER
----- Message from Kallie Lopez MA sent at 8/11/2022  4:28 PM CDT -----  [facility] Clinical Support  Caller: Pt (Today, 12:28 PM)         Previous Messages       ----- Message -----   From: Shena Zavala   Sent: 8/11/2022  12:30 PM CDT   To: Tristin Prado Staff   Subject: appt                                             Please call the pt today @ [phone].   Pt need appt at [facility] with .

## 2022-08-12 NOTE — TELEPHONE ENCOUNTER
----- Message from Sola Garcia MA sent at 8/12/2022  9:29 AM CDT -----  Contact: self    ----- Message -----  From: Vida Villegas  Sent: 8/12/2022   9:26 AM CDT  To: Tristin Prado Staff    Type: Patient Returning Call        Who Called: Patient  Who Left Message for Patient: Cheryle Quintana   Does the patient know what this is regarding?: yes appt  Best Call Back Number: [phone] ([place])   Additional Information: Pt just ret a call wants a call back from the office. Thanks.

## 2022-10-03 ENCOUNTER — TELEPHONE (OUTPATIENT)
Dept: OPHTHALMOLOGY | Facility: CLINIC | Age: 72
End: 2022-10-03
Payer: MEDICARE

## 2022-10-03 NOTE — TELEPHONE ENCOUNTER
----- Message from Sola Garcia MA sent at 9/30/2022  2:07 PM CDT -----  Contact: Self    ----- Message -----  From: Arlene Montiel MA  Sent: 9/29/2022   3:41 PM CDT  To: Sola Garcia MA      ----- Message -----  From: Niya Swann  Sent: 9/29/2022   2:26 PM CDT  To: Tristin Prado Staff    Type:  Patient Returning Call    Who Called:  Patient  Who Left Message for Patient:  N/A- Needs to speak to the nurse  Does the patient know what this is regarding?:  appt on 10/6-needs to know if they will dilate her eyes for diplopia/ and if she'll need a ride  Best Call Back Number:  [phone]  Additional Information:  Please call pt back to advise. Thank You

## 2022-10-06 ENCOUNTER — OFFICE VISIT (OUTPATIENT)
Dept: OPHTHALMOLOGY | Facility: CLINIC | Age: 72
End: 2022-10-06
Payer: MEDICARE

## 2022-10-06 DIAGNOSIS — H53.2 MONOCULAR DIPLOPIA, BOTH EYES: Primary | ICD-10-CM

## 2022-10-06 DIAGNOSIS — H04.123 INSUFFICIENCY OF TEAR FILM OF BOTH EYES: ICD-10-CM

## 2022-10-06 PROCEDURE — 99999 PR PBB SHADOW E&M-EST. PATIENT-LVL III: ICD-10-PCS | Mod: PBBFAC,,, | Performed by: OPHTHALMOLOGY

## 2022-10-06 PROCEDURE — 99203 PR OFFICE/OUTPT VISIT, NEW, LEVL III, 30-44 MIN: ICD-10-PCS | Mod: S$PBB,,, | Performed by: OPHTHALMOLOGY

## 2022-10-06 PROCEDURE — 99203 OFFICE O/P NEW LOW 30 MIN: CPT | Mod: S$PBB,,, | Performed by: OPHTHALMOLOGY

## 2022-10-06 PROCEDURE — 99213 OFFICE O/P EST LOW 20 MIN: CPT | Mod: PBBFAC,PO | Performed by: OPHTHALMOLOGY

## 2022-10-06 PROCEDURE — 99999 PR PBB SHADOW E&M-EST. PATIENT-LVL III: CPT | Mod: PBBFAC,,, | Performed by: OPHTHALMOLOGY

## 2022-10-06 RX ORDER — CYCLOSPORINE 0.5 MG/ML
1 EMULSION OPHTHALMIC 2 TIMES DAILY
COMMUNITY
Start: 2022-08-22

## 2022-10-06 NOTE — PROGRESS NOTES
HPI     Diplopia            Comments: Diplopia          Comments    DLE: x 2 wks    Pt states has had several falls and last one was 1 month ago. Hit her left   eye but her ophthalmologist said he saw not problem but she has been   having diplopia since then. Started using restasis and its better but   still sees some double. Diplopia is horizontal.    Gtts: Restasis BID OU    I have personally interviewed the patient, reviewed the history and   examined the patient and agree with the technician's exam.           Last edited by Johan Crow MD on 10/6/2022  1:42 PM.            Assessment /Plan     For exam results, see Encounter Report.    Monocular diplopia, both eyes    Insufficiency of tear film of both eyes      Ms. Torres has monocular diplopia that appears due to a tear deficiency. She is a unilateral pseudophake and hat may be responsible for distortion of images as well. I found no evidence of binocular diplopia that would indicate a neurologic cause. She will continue eye care with Dr Prater and return to me as needed.

## 2022-10-06 NOTE — LETTER
[place] [facility]  [address]  [facility] [address]  Phone: [phone]  Fax: [phone]   October 6, 2022    Kala Prater, OD  [address]  [address]  [facility] [address]    Patient: Kari Torres   MR Number: [id number]   YOB: 1950   Date of Visit: 10/6/2022       Dear Dr. Prater:    Thank you for referring Kari Torres to me for evaluation. Here is my assessment and plan of care:    Assessment/Plan    For exam results, see Encounter Report.    Monocular diplopia, both eyes    Insufficiency of tear film of both eyes      Ms. Torres has monocular diplopia that appears due to a tear deficiency. She is a unilateral pseudophake and hat may be responsible for distortion of images as well. I found no evidence of binocular diplopia that would indicate a neurologic cause. She will continue eye care with Dr Prater and return to me as needed.          Below you will find my full exam findings. If you have questions, please do not hesitate to call me. I look forward to following Ms. Kari Torres along with you.    Sincerely,          Johan Crow MD       CC  No Recipients             Base Eye Exam       Visual Acuity (Snellen - Linear)         Right Left    Dist cc 20/40 20/30 -2    Near cc J5 J6      Correction: Glasses   Blurry at near             Pupils         Dark Light Shape React APD    Right 4 2 Round Brisk None    Left 4 2 Round Brisk None              Visual Fields (Counting fingers)         Right Left     Full Full              Extraocular Movement         Right Left     Full, Ortho Full, Ortho              Dilation    Patient deferred dilation due to need to drive.                 Slit Lamp and Fundus Exam       External Exam         Right Left    External Normal Normal              Slit Lamp Exam         Right Left    Lids/Lashes Normal Normal    Conjunctiva/Sclera White and quiet White and quiet    Cornea Immediate tear break  upaa Immediate tear break upaa    Anterior Chamber Deep and quiet Deep and quiet    Iris Round and reactive Round and reactive    Lens 2+ Nuclear sclerosis Posterior chamber intraocular lens

## 2023-03-20 PROBLEM — R41.89 ACUTE COGNITIVE DECLINE: Status: ACTIVE | Noted: 2023-03-20

## 2023-03-20 PROBLEM — I48.91 ATRIAL FIBRILLATION: Status: ACTIVE | Noted: 2023-03-20

## 2023-07-14 PROBLEM — R29.818 NEUROLOGICAL DEFICIT PRESENT: Status: ACTIVE | Noted: 2023-07-14

## 2023-07-14 PROBLEM — R47.81 SLURRED SPEECH: Status: ACTIVE | Noted: 2023-07-14
